# Patient Record
Sex: FEMALE | Race: BLACK OR AFRICAN AMERICAN | NOT HISPANIC OR LATINO | URBAN - METROPOLITAN AREA
[De-identification: names, ages, dates, MRNs, and addresses within clinical notes are randomized per-mention and may not be internally consistent; named-entity substitution may affect disease eponyms.]

---

## 2020-06-15 ENCOUNTER — OFFICE VISIT (OUTPATIENT)
Dept: URBAN - METROPOLITAN AREA CLINIC 94 | Facility: CLINIC | Age: 40
End: 2020-06-15

## 2020-06-16 ENCOUNTER — TELEPHONE ENCOUNTER (OUTPATIENT)
Dept: URBAN - METROPOLITAN AREA CLINIC 23 | Facility: CLINIC | Age: 40
End: 2020-06-16

## 2020-06-18 ENCOUNTER — TELEPHONE ENCOUNTER (OUTPATIENT)
Dept: URBAN - METROPOLITAN AREA CLINIC 92 | Facility: CLINIC | Age: 40
End: 2020-06-18

## 2020-07-01 ENCOUNTER — LAB OUTSIDE AN ENCOUNTER (OUTPATIENT)
Dept: URBAN - METROPOLITAN AREA CLINIC 92 | Facility: CLINIC | Age: 40
End: 2020-07-01

## 2020-07-01 ENCOUNTER — TELEPHONE ENCOUNTER (OUTPATIENT)
Dept: URBAN - METROPOLITAN AREA CLINIC 92 | Facility: CLINIC | Age: 40
End: 2020-07-01

## 2020-07-02 ENCOUNTER — TELEPHONE ENCOUNTER (OUTPATIENT)
Dept: URBAN - METROPOLITAN AREA CLINIC 23 | Facility: CLINIC | Age: 40
End: 2020-07-02

## 2020-07-15 ENCOUNTER — OFFICE VISIT (OUTPATIENT)
Dept: URBAN - METROPOLITAN AREA MEDICAL CENTER 28 | Facility: MEDICAL CENTER | Age: 40
End: 2020-07-15

## 2020-09-28 ENCOUNTER — ERX REFILL RESPONSE (OUTPATIENT)
Age: 40
End: 2020-09-28

## 2020-09-28 RX ORDER — SCOPOLAMINE 1 MG/3D
APPLY 1 PATCH BY TO THE HAIRLESS SKIN AREA BEHIND 1 EAR, REAPPLY EVERY 3 DAYS AS NEEDED PATCH TRANSDERMAL
Qty: 2 | Refills: 4

## 2020-10-13 ENCOUNTER — ERX REFILL RESPONSE (OUTPATIENT)
Age: 40
End: 2020-10-13

## 2020-10-13 RX ORDER — DEXLANSOPRAZOLE 60 MG/1
TAKE 1 CAPSULE BY MOUTH EVERY DAY CAPSULE, DELAYED RELEASE ORAL
Qty: 30 | Refills: 8

## 2020-12-28 ENCOUNTER — TELEPHONE ENCOUNTER (OUTPATIENT)
Dept: URBAN - METROPOLITAN AREA CLINIC 92 | Facility: CLINIC | Age: 40
End: 2020-12-28

## 2020-12-28 RX ORDER — SCOPOLAMINE 1 MG/3D
APPLY 1 PATCH BY TO THE HAIRLESS SKIN AREA BEHIND 1 EAR, REAPPLY EVERY 3 DAYS AS NEEDED PATCH TRANSDERMAL
Qty: 2 | Refills: 4 | Status: ACTIVE | COMMUNITY

## 2020-12-28 RX ORDER — DEXLANSOPRAZOLE 60 MG/1
TAKE 1 CAPSULE BY MOUTH EVERY DAY CAPSULE, DELAYED RELEASE ORAL
Qty: 30 | Refills: 8 | Status: ACTIVE | COMMUNITY

## 2020-12-28 RX ORDER — FAMOTIDINE 40 MG/1
1 TABLET AT BEDTIME TABLET, FILM COATED ORAL ONCE A DAY
Qty: 90 TABLET | Refills: 3 | Status: ACTIVE | COMMUNITY
Start: 2020-12-28

## 2020-12-28 RX ORDER — FAMOTIDINE 40 MG/1
1 TABLET AT BEDTIME TABLET, FILM COATED ORAL ONCE A DAY
Qty: 90 TABLET | Refills: 3 | OUTPATIENT
Start: 2020-12-28

## 2021-01-13 ENCOUNTER — OFFICE VISIT (OUTPATIENT)
Dept: URBAN - METROPOLITAN AREA CLINIC 92 | Facility: CLINIC | Age: 41
End: 2021-01-13
Payer: MEDICARE

## 2021-01-13 DIAGNOSIS — Z98.84 HISTORY OF ROUX-EN-Y GASTRIC BYPASS: ICD-10-CM

## 2021-01-13 DIAGNOSIS — R10.9 ABDOMINAL PAIN: ICD-10-CM

## 2021-01-13 DIAGNOSIS — K21.9 GERD: ICD-10-CM

## 2021-01-13 DIAGNOSIS — R11.2 NAUSEA & VOMITING: ICD-10-CM

## 2021-01-13 PROCEDURE — G8427 DOCREV CUR MEDS BY ELIG CLIN: HCPCS | Performed by: INTERNAL MEDICINE

## 2021-01-13 PROCEDURE — G8417 CALC BMI ABV UP PARAM F/U: HCPCS | Performed by: INTERNAL MEDICINE

## 2021-01-13 PROCEDURE — G8482 FLU IMMUNIZE ORDER/ADMIN: HCPCS | Performed by: INTERNAL MEDICINE

## 2021-01-13 PROCEDURE — 1036F TOBACCO NON-USER: CPT | Performed by: INTERNAL MEDICINE

## 2021-01-13 PROCEDURE — 99214 OFFICE O/P EST MOD 30 MIN: CPT | Performed by: INTERNAL MEDICINE

## 2021-01-13 RX ORDER — NORTRIPTYLINE HYDROCHLORIDE 25 MG/1
1 CAPSULE CAPSULE ORAL AT NIGHT
Qty: 30 | Refills: 11 | OUTPATIENT
Start: 2021-01-13

## 2021-01-13 RX ORDER — DEXLANSOPRAZOLE 60 MG/1
TAKE 1 CAPSULE BY MOUTH EVERY DAY CAPSULE, DELAYED RELEASE ORAL
Qty: 30 | Refills: 8 | Status: ACTIVE | COMMUNITY

## 2021-01-13 NOTE — HPI-TODAY'S VISIT:
This is a 40-year-old female who presents today for a sick visit.  She is typically cared for by Dr. Murphy.  She notes that for the last 2 to 3 months she has had increasing abdominal pain and cramping mostly lower this can be severe and last for couple of days or just last for a couple of hours.  There are no exacerbating factors.  She does have relief after a bowel movement on some occasions.  She has had some relief with dicyclomine.  She has had endoscopic evaluation in the past with EGD and colonoscopy.  She notes having had a CT scan with another provider last month which was reportedly normal.  Otherwise her appetite is normal.  She does have intermittent nausea and vomiting at baseline.

## 2021-02-02 ENCOUNTER — OFFICE VISIT (OUTPATIENT)
Dept: URBAN - METROPOLITAN AREA CLINIC 52 | Facility: CLINIC | Age: 41
End: 2021-02-02
Payer: MEDICARE

## 2021-02-02 ENCOUNTER — WEB ENCOUNTER (OUTPATIENT)
Dept: URBAN - METROPOLITAN AREA CLINIC 52 | Facility: CLINIC | Age: 41
End: 2021-02-02

## 2021-02-02 DIAGNOSIS — R10.9 ABDOMINAL PAIN: ICD-10-CM

## 2021-02-02 DIAGNOSIS — R19.7 DIARRHEA: ICD-10-CM

## 2021-02-02 DIAGNOSIS — K21.9 GERD: ICD-10-CM

## 2021-02-02 DIAGNOSIS — R11.2 NAUSEA & VOMITING: ICD-10-CM

## 2021-02-02 PROCEDURE — G8427 DOCREV CUR MEDS BY ELIG CLIN: HCPCS | Performed by: INTERNAL MEDICINE

## 2021-02-02 PROCEDURE — G8420 CALC BMI NORM PARAMETERS: HCPCS | Performed by: INTERNAL MEDICINE

## 2021-02-02 PROCEDURE — 99214 OFFICE O/P EST MOD 30 MIN: CPT | Performed by: INTERNAL MEDICINE

## 2021-02-02 PROCEDURE — 1036F TOBACCO NON-USER: CPT | Performed by: INTERNAL MEDICINE

## 2021-02-02 RX ORDER — PANTOPRAZOLE SODIUM 40 MG/1
1 TABLET TABLET, DELAYED RELEASE ORAL ONCE A DAY
Qty: 30 | Refills: 6 | OUTPATIENT
Start: 2021-02-02

## 2021-02-02 RX ORDER — FAMOTIDINE 40 MG/1
1 TABLET AT BEDTIME TABLET, FILM COATED ORAL ONCE A DAY
Status: ACTIVE | COMMUNITY

## 2021-02-02 RX ORDER — COLESEVELAM HYDROCHLORIDE 625 MG/1
1 TABLET TABLET, COATED ORAL TWICE A DAY
Qty: 60 | Refills: 6 | OUTPATIENT
Start: 2021-02-02

## 2021-02-02 RX ORDER — NORTRIPTYLINE HYDROCHLORIDE 25 MG/1
1 CAPSULE CAPSULE ORAL AT NIGHT
Qty: 30 | Refills: 11 | Status: ACTIVE | COMMUNITY
Start: 2021-01-13

## 2021-02-02 RX ORDER — DEXLANSOPRAZOLE 60 MG/1
TAKE 1 CAPSULE BY MOUTH EVERY DAY CAPSULE, DELAYED RELEASE ORAL
Qty: 30 | Refills: 8 | Status: ON HOLD | COMMUNITY

## 2021-02-02 RX ORDER — DICYCLOMINE HYDROCHLORIDE 20 MG/1
1 TABLET TABLET ORAL THREE TIMES A DAY
Status: ACTIVE | COMMUNITY

## 2021-02-02 NOTE — HPI-TODAY'S VISIT:
4 months she has had crampy diffuse abdominal pain and can be severe and last for couple of days or just last for a couple of hours.  There are no exacerbating factors.  She does have relief after a bowel movement on some occasions.  She has had some relief with dicyclomine.  CT scan with another provider 12/2020 was normal per pt.   Intermittent nausea and vomiting daily. Takes Pepcid TID.  2001-Gastric bypass. 9/20186002-Kcxwkrdiogg-Wlpzgo. 1 Tub Adenoma. 6 Hyperplastic 9/2019-EGD-HH. Mild gastritis. Food. BX of D and GEJ WNL. 5/20203501-CCF-Xrue En Y stomach. Stenosis dilated to 15 5/20204627-QMG-Qqvt En Y. Reflux. 3096-CXNW-RR

## 2021-02-05 ENCOUNTER — TELEPHONE ENCOUNTER (OUTPATIENT)
Dept: URBAN - METROPOLITAN AREA CLINIC 52 | Facility: CLINIC | Age: 41
End: 2021-02-05

## 2021-02-05 ENCOUNTER — TELEPHONE ENCOUNTER (OUTPATIENT)
Dept: URBAN - METROPOLITAN AREA CLINIC 92 | Facility: CLINIC | Age: 41
End: 2021-02-05

## 2021-02-05 ENCOUNTER — LAB OUTSIDE AN ENCOUNTER (OUTPATIENT)
Dept: URBAN - METROPOLITAN AREA CLINIC 92 | Facility: CLINIC | Age: 41
End: 2021-02-05

## 2021-02-05 LAB
C-REACTIVE PROTEIN, QUANT: 43
ENDOMYSIAL ANTIBODY IGA: NEGATIVE
F002-IGE MILK: <0.1
F004-IGE WHEAT: <0.1
F008-IGE CORN: 0.21
F013-IGE PEANUT: <0.1
F014-IGE SOYBEAN: <0.1
F026-IGE PORK: <0.1
F027-IGE BEEF: <0.1
F093-IGE CHOCOLATE/CACAO: <0.1
F245-IGE EGG, WHOLE: <0.1
FX02-IGE FOOD MIX (SEAFOODS): NEGATIVE
IMMUNOGLOBULIN A, QN, SERUM: 455
Lab: (no result)
T-TRANSGLUTAMINASE (TTG) IGA: <2

## 2021-02-12 ENCOUNTER — TELEPHONE ENCOUNTER (OUTPATIENT)
Dept: URBAN - METROPOLITAN AREA CLINIC 92 | Facility: CLINIC | Age: 41
End: 2021-02-12

## 2021-02-12 RX ORDER — DICYCLOMINE HYDROCHLORIDE 20 MG/1
1 TABLET TABLET ORAL THREE TIMES A DAY
Qty: 42 TABLET | Refills: 1

## 2021-02-18 ENCOUNTER — OFFICE VISIT (OUTPATIENT)
Dept: URBAN - METROPOLITAN AREA CLINIC 52 | Facility: CLINIC | Age: 41
End: 2021-02-18

## 2021-02-19 ENCOUNTER — TELEPHONE ENCOUNTER (OUTPATIENT)
Dept: URBAN - METROPOLITAN AREA CLINIC 92 | Facility: CLINIC | Age: 41
End: 2021-02-19

## 2021-02-24 ENCOUNTER — TELEPHONE ENCOUNTER (OUTPATIENT)
Dept: URBAN - METROPOLITAN AREA CLINIC 92 | Facility: CLINIC | Age: 41
End: 2021-02-24

## 2021-02-24 RX ORDER — CIPROFLOXACIN HYDROCHLORIDE 500 MG/1
1 TABLET TABLET, FILM COATED ORAL
Qty: 20 | Refills: 0 | OUTPATIENT
Start: 2021-02-24 | End: 2021-03-06

## 2021-02-24 RX ORDER — FAMOTIDINE 40 MG/1
1 TABLET AT BEDTIME TABLET, FILM COATED ORAL ONCE A DAY
Status: ACTIVE | COMMUNITY

## 2021-02-24 RX ORDER — PANTOPRAZOLE SODIUM 40 MG/1
1 TABLET TABLET, DELAYED RELEASE ORAL ONCE A DAY
Qty: 30 | Refills: 6 | Status: ACTIVE | COMMUNITY
Start: 2021-02-02

## 2021-02-24 RX ORDER — COLESEVELAM HYDROCHLORIDE 625 MG/1
1 TABLET TABLET, COATED ORAL TWICE A DAY
Qty: 60 | Refills: 6 | Status: ACTIVE | COMMUNITY
Start: 2021-02-02

## 2021-02-24 RX ORDER — NORTRIPTYLINE HYDROCHLORIDE 25 MG/1
1 CAPSULE CAPSULE ORAL AT NIGHT
Qty: 30 | Refills: 11 | Status: ACTIVE | COMMUNITY
Start: 2021-01-13

## 2021-02-24 RX ORDER — DICYCLOMINE HYDROCHLORIDE 20 MG/1
1 TABLET TABLET ORAL THREE TIMES A DAY
Qty: 42 TABLET | Refills: 1 | Status: ACTIVE | COMMUNITY

## 2021-02-24 RX ORDER — DEXLANSOPRAZOLE 60 MG/1
TAKE 1 CAPSULE BY MOUTH EVERY DAY CAPSULE, DELAYED RELEASE ORAL
Qty: 30 | Refills: 8 | Status: ON HOLD | COMMUNITY

## 2021-02-24 RX ORDER — METRONIDAZOLE 250 MG/1
1 TABLET TABLET ORAL
Qty: 56 TABLET | Refills: 0 | OUTPATIENT
Start: 2021-02-24 | End: 2021-03-10

## 2021-03-12 ENCOUNTER — CLAIMS CREATED FROM THE CLAIM WINDOW (OUTPATIENT)
Dept: URBAN - METROPOLITAN AREA CLINIC 94 | Facility: CLINIC | Age: 41
End: 2021-03-12
Payer: MEDICARE

## 2021-03-12 DIAGNOSIS — R93.5 ABNORMAL MRI OF ABDOMEN: ICD-10-CM

## 2021-03-12 DIAGNOSIS — R10.84 GENERALIZED ABDOMINAL PAIN: ICD-10-CM

## 2021-03-12 DIAGNOSIS — R11.2 NON-INTRACTABLE VOMITING WITH NAUSEA, UNSPECIFIED VOMITING TYPE: ICD-10-CM

## 2021-03-12 PROCEDURE — 99203 OFFICE O/P NEW LOW 30 MIN: CPT | Performed by: INTERNAL MEDICINE

## 2021-03-12 PROCEDURE — 99213 OFFICE O/P EST LOW 20 MIN: CPT | Performed by: INTERNAL MEDICINE

## 2021-03-12 RX ORDER — FAMOTIDINE 40 MG/1
1 TABLET AT BEDTIME TABLET, FILM COATED ORAL ONCE A DAY
Status: ACTIVE | COMMUNITY

## 2021-03-12 RX ORDER — PANTOPRAZOLE SODIUM 40 MG/1
1 TABLET TABLET, DELAYED RELEASE ORAL ONCE A DAY
Qty: 30 | Refills: 6 | Status: ACTIVE | COMMUNITY
Start: 2021-02-02

## 2021-03-12 RX ORDER — DICYCLOMINE HYDROCHLORIDE 20 MG/1
1 TABLET TABLET ORAL THREE TIMES A DAY
Qty: 42 TABLET | Refills: 1 | Status: ACTIVE | COMMUNITY

## 2021-03-12 RX ORDER — DEXLANSOPRAZOLE 60 MG/1
TAKE 1 CAPSULE BY MOUTH EVERY DAY CAPSULE, DELAYED RELEASE ORAL
Qty: 30 | Refills: 8 | Status: ON HOLD | COMMUNITY

## 2021-03-12 RX ORDER — NORTRIPTYLINE HYDROCHLORIDE 25 MG/1
1 CAPSULE CAPSULE ORAL AT NIGHT
Qty: 30 | Refills: 11 | Status: ACTIVE | COMMUNITY
Start: 2021-01-13

## 2021-03-12 RX ORDER — ONDANSETRON 8 MG/1
1 TABLET ON THE TONGUE AND ALLOW TO DISSOLVE  AS NEEDED TABLET, ORALLY DISINTEGRATING ORAL
Qty: 20 | Refills: 2 | OUTPATIENT
Start: 2021-03-12

## 2021-03-12 RX ORDER — COLESEVELAM HYDROCHLORIDE 625 MG/1
1 TABLET TABLET, COATED ORAL TWICE A DAY
Qty: 60 | Refills: 6 | Status: ON HOLD | COMMUNITY
Start: 2021-02-02

## 2021-03-12 NOTE — HPI-TODAY'S VISIT:
4 months crampy diffuse abdominal pain and can be severe and last for couple of days or for a couple of hours.  no exacerbating factors.  She does have relief after a bowel movement on some occasions and some relief with dicyclomine.  CT scan with another provider 12/2020 was normal per pt.   Intermittent nausea and vomiting daily. Takes Pepcid TID.  2001-Gastric bypass. 9/20183646-Rffrmzuqgoh-Xgvuvb. 1 Tub Adenoma. 6 Hyperplastic 9/2019-EGD-HH. Mild gastritis. Food. BX of D and GEJ WNL. 5/20208270-IKY-Pvdb En Y stomach. Stenosis dilated to 15 5/20203441-OGD-Ntpz En Y. Reflux. 4382-USUJ-MR 2/2021-MRE-mild thickening in proximal small intestine She was RXed Cipro and Flagyl for possible infectious issue. Took for about 7-10 days. Developed bad NV and abd pain. DCed

## 2021-03-12 NOTE — PHYSICAL EXAM GASTROINTESTINAL
Abdomen , soft, tender on Left side, nondistended , no guarding or rigidity , no masses palpable , normal bowel sounds , Liver and Spleen , no hepatomegaly present , no hepatosplenomegaly , liver nontender , spleen not palpable

## 2021-03-16 LAB
A/G RATIO: 1.4
ALBUMIN: 4.2
ALKALINE PHOSPHATASE: 74
ALT (SGPT): 14
AST (SGOT): 29
BASO (ABSOLUTE): 0
BASOS: 0
BILIRUBIN, TOTAL: 0.5
BUN/CREATININE RATIO: 9
BUN: 9
C-REACTIVE PROTEIN, QUANT: <1
CALCIUM: 9.8
CARBON DIOXIDE, TOTAL: 28
CHLORIDE: 105
CREATININE: 1
EGFR IF AFRICN AM: 80
EGFR IF NONAFRICN AM: 70
EOS (ABSOLUTE): 0
EOS: 1
GLOBULIN, TOTAL: 2.9
GLUCOSE: 107
HEMATOCRIT: 37.9
HEMATOLOGY COMMENTS:: (no result)
HEMOGLOBIN: 12.8
IMMATURE CELLS: (no result)
IMMATURE GRANS (ABS): 0
IMMATURE GRANULOCYTES: 0
LYMPHS (ABSOLUTE): 1.9
LYMPHS: 43
MCH: 33.5
MCHC: 33.8
MCV: 99
MONOCYTES(ABSOLUTE): 0.4
MONOCYTES: 9
NEUTROPHILS (ABSOLUTE): 2.1
NEUTROPHILS: 47
NRBC: (no result)
PLATELETS: 197
POTASSIUM: 3.7
PROTEIN, TOTAL: 7.1
RBC: 3.82
RDW: 13
SACCHAROMYCES CEREVISIAE, IGA: 20.4
SACCHAROMYCES CEREVISIAE, IGG: 22.8
SODIUM: 147
WBC: 4.4

## 2021-04-15 ENCOUNTER — CLAIMS CREATED FROM THE CLAIM WINDOW (OUTPATIENT)
Dept: URBAN - METROPOLITAN AREA CLINIC 52 | Facility: CLINIC | Age: 41
End: 2021-04-15
Payer: MEDICARE

## 2021-04-15 ENCOUNTER — LAB OUTSIDE AN ENCOUNTER (OUTPATIENT)
Dept: URBAN - METROPOLITAN AREA CLINIC 52 | Facility: CLINIC | Age: 41
End: 2021-04-15

## 2021-04-15 DIAGNOSIS — K64.1 GRADE II HEMORRHOIDS: ICD-10-CM

## 2021-04-15 DIAGNOSIS — K50.00 CROHN'S DISEASE OF SMALL INTESTINE WITHOUT COMPLICATION: ICD-10-CM

## 2021-04-15 DIAGNOSIS — Z86.010 HISTORY OF COLON POLYPS: ICD-10-CM

## 2021-04-15 PROCEDURE — 99204 OFFICE O/P NEW MOD 45 MIN: CPT | Performed by: INTERNAL MEDICINE

## 2021-04-15 PROCEDURE — 99214 OFFICE O/P EST MOD 30 MIN: CPT | Performed by: INTERNAL MEDICINE

## 2021-04-15 RX ORDER — COLESEVELAM HYDROCHLORIDE 625 MG/1
1 TABLET TABLET, COATED ORAL TWICE A DAY
Qty: 60 | Refills: 6 | Status: ON HOLD | COMMUNITY
Start: 2021-02-02

## 2021-04-15 RX ORDER — DICYCLOMINE HYDROCHLORIDE 20 MG/1
1 TABLET TABLET ORAL THREE TIMES A DAY
Qty: 42 TABLET | Refills: 1 | Status: ACTIVE | COMMUNITY

## 2021-04-15 RX ORDER — DEXLANSOPRAZOLE 60 MG/1
TAKE 1 CAPSULE BY MOUTH EVERY DAY CAPSULE, DELAYED RELEASE ORAL
Qty: 30 | Refills: 8 | Status: ON HOLD | COMMUNITY

## 2021-04-15 RX ORDER — ONDANSETRON 8 MG/1
1 TABLET ON THE TONGUE AND ALLOW TO DISSOLVE  AS NEEDED TABLET, ORALLY DISINTEGRATING ORAL
Qty: 20 | Refills: 2 | Status: ACTIVE | COMMUNITY
Start: 2021-03-12

## 2021-04-15 RX ORDER — NORTRIPTYLINE HYDROCHLORIDE 25 MG/1
1 CAPSULE CAPSULE ORAL AT NIGHT
Qty: 30 | Refills: 11 | Status: ACTIVE | COMMUNITY
Start: 2021-01-13

## 2021-04-15 RX ORDER — PANTOPRAZOLE SODIUM 40 MG/1
1 TABLET TABLET, DELAYED RELEASE ORAL ONCE A DAY
Qty: 30 | Refills: 6 | Status: DISCONTINUED | COMMUNITY
Start: 2021-02-02

## 2021-04-15 RX ORDER — POLYETHYLENE GLYCOL 3350 17 G/17G
AS DIRECTED POWDER, FOR SOLUTION ORAL
Qty: 238 GRAM | Refills: 0 | OUTPATIENT
Start: 2021-04-15 | End: 2021-04-16

## 2021-04-15 RX ORDER — FAMOTIDINE 40 MG/1
1 TABLET AT BEDTIME TABLET, FILM COATED ORAL ONCE A DAY
Status: DISCONTINUED | COMMUNITY

## 2021-04-15 RX ORDER — MESALAMINE 500 MG/1
2 CAPSULES CAPSULE ORAL THREE TIMES A DAY
Qty: 180 CAPSULE | Refills: 4 | OUTPATIENT
Start: 2021-04-15 | End: 2021-09-12

## 2021-04-15 NOTE — HPI-TODAY'S VISIT:
crampy diffuse abdominal pain and can be severe and last for couple of days or for a couple of hours.  no exacerbating factors.  She does have relief after a bowel movement on some occasions and some relief with dicyclomine.  CT scan with another provider 12/2020 was normal per pt.   Intermittent nausea and vomiting daily. Takes Pepcid TID.  2001-Gastric bypass. 2/20198597-Cplzlhrvbbk-Hvoceo.1 Tub Adenoma.6 Hyperplastic IH 9/2019-EGD-HH. Mild gastritis. Food. BX of D and GEJ WNL. 5/20206050-JIJ-Yemj En Y stomach. Stenosis dilated to 15 5/20208318-TZX-Ormm En Y. Reflux. 6752-IADC-CN 2/2021-MRE-mild thickening in proximal small intestine She was RXed Cipro and Flagyl for possible infectious issue. Took for about 7-10 days. Developed bad NV and abd pain. DCed

## 2021-04-22 LAB
BASO (ABSOLUTE): 0
BASOS: 0
C-REACTIVE PROTEIN, QUANT: <1
EOS (ABSOLUTE): 0
EOS: 1
HBSAG SCREEN: NEGATIVE
HEMATOCRIT: 39.4
HEMATOLOGY COMMENTS:: (no result)
HEMOGLOBIN: 13.4
HEP B CORE AB, TOT: NEGATIVE
HEP B SURFACE AB, QUAL: NON REACTIVE
IMMATURE CELLS: (no result)
IMMATURE GRANS (ABS): 0
IMMATURE GRANULOCYTES: 0
INTERPRETATION:: (no result)
LYMPHS (ABSOLUTE): 1.1
LYMPHS: 29
Lab: (no result)
MCH: 35.6
MCHC: 34
MCV: 105
MONOCYTES(ABSOLUTE): 0.4
MONOCYTES: 11
NEUTROPHILS (ABSOLUTE): 2.1
NEUTROPHILS: 59
NRBC: (no result)
PLATELETS: 211
QUANTIFERON CRITERIA: (no result)
QUANTIFERON INCUBATION: (no result)
QUANTIFERON MITOGEN VALUE: >10
QUANTIFERON NIL VALUE: 0.04
QUANTIFERON TB1 AG VALUE: 0.02
QUANTIFERON TB2 AG VALUE: 0.03
QUANTIFERON-TB GOLD PLUS: NEGATIVE
RBC: 3.76
RDW: 13.4
TPMT ACTIVITY: 26.3
TSH: 1.01
WBC: 3.7

## 2021-04-26 PROBLEM — 56689002: Status: ACTIVE | Noted: 2021-04-15

## 2021-05-12 ENCOUNTER — OFFICE VISIT (OUTPATIENT)
Dept: URBAN - METROPOLITAN AREA SURGERY CENTER 17 | Facility: SURGERY CENTER | Age: 41
End: 2021-05-12

## 2021-05-21 ENCOUNTER — OFFICE VISIT (OUTPATIENT)
Dept: URBAN - METROPOLITAN AREA SURGERY CENTER 17 | Facility: SURGERY CENTER | Age: 41
End: 2021-05-21
Payer: MEDICARE

## 2021-05-21 DIAGNOSIS — D12.2 ADENOMA OF ASCENDING COLON: ICD-10-CM

## 2021-05-21 DIAGNOSIS — R93.3 ABN FINDINGS-GI TRACT: ICD-10-CM

## 2021-05-21 DIAGNOSIS — R10.84 ABDOMINAL CRAMPING, GENERALIZED: ICD-10-CM

## 2021-05-21 DIAGNOSIS — K64.1 GRADE II HEMORRHOIDS: ICD-10-CM

## 2021-05-21 DIAGNOSIS — K63.89 BACTERIAL OVERGROWTH SYNDROME: ICD-10-CM

## 2021-05-21 PROCEDURE — 45385 COLONOSCOPY W/LESION REMOVAL: CPT | Performed by: INTERNAL MEDICINE

## 2021-05-21 PROCEDURE — 45380 COLONOSCOPY AND BIOPSY: CPT | Performed by: INTERNAL MEDICINE

## 2021-05-21 PROCEDURE — G8907 PT DOC NO EVENTS ON DISCHARG: HCPCS | Performed by: INTERNAL MEDICINE

## 2021-05-21 PROCEDURE — 46221 LIGATION OF HEMORRHOID(S): CPT | Performed by: INTERNAL MEDICINE

## 2021-05-21 RX ORDER — MESALAMINE 500 MG/1
2 CAPSULES CAPSULE ORAL THREE TIMES A DAY
Qty: 180 CAPSULE | Refills: 4 | Status: ACTIVE | COMMUNITY
Start: 2021-04-15 | End: 2021-09-12

## 2021-05-21 RX ORDER — DICYCLOMINE HYDROCHLORIDE 20 MG/1
1 TABLET TABLET ORAL THREE TIMES A DAY
Qty: 42 TABLET | Refills: 1 | Status: ACTIVE | COMMUNITY

## 2021-05-21 RX ORDER — NORTRIPTYLINE HYDROCHLORIDE 25 MG/1
1 CAPSULE CAPSULE ORAL AT NIGHT
Qty: 30 | Refills: 11 | Status: ACTIVE | COMMUNITY
Start: 2021-01-13

## 2021-05-21 RX ORDER — DEXLANSOPRAZOLE 60 MG/1
TAKE 1 CAPSULE BY MOUTH EVERY DAY CAPSULE, DELAYED RELEASE ORAL
Qty: 30 | Refills: 8 | Status: ON HOLD | COMMUNITY

## 2021-05-21 RX ORDER — COLESEVELAM HYDROCHLORIDE 625 MG/1
1 TABLET TABLET, COATED ORAL TWICE A DAY
Qty: 60 | Refills: 6 | Status: ON HOLD | COMMUNITY
Start: 2021-02-02

## 2021-05-21 RX ORDER — ONDANSETRON 8 MG/1
1 TABLET ON THE TONGUE AND ALLOW TO DISSOLVE  AS NEEDED TABLET, ORALLY DISINTEGRATING ORAL
Qty: 20 | Refills: 2 | Status: ACTIVE | COMMUNITY
Start: 2021-03-12

## 2021-05-22 ENCOUNTER — TELEPHONE ENCOUNTER (OUTPATIENT)
Dept: URBAN - METROPOLITAN AREA CLINIC 94 | Facility: CLINIC | Age: 41
End: 2021-05-22

## 2021-05-25 ENCOUNTER — LAB OUTSIDE AN ENCOUNTER (OUTPATIENT)
Dept: URBAN - METROPOLITAN AREA CLINIC 52 | Facility: CLINIC | Age: 41
End: 2021-05-25

## 2021-05-25 ENCOUNTER — TELEPHONE ENCOUNTER (OUTPATIENT)
Dept: URBAN - METROPOLITAN AREA CLINIC 92 | Facility: CLINIC | Age: 41
End: 2021-05-25

## 2021-05-25 ENCOUNTER — CLAIMS CREATED FROM THE CLAIM WINDOW (OUTPATIENT)
Dept: URBAN - METROPOLITAN AREA CLINIC 52 | Facility: CLINIC | Age: 41
End: 2021-05-25
Payer: MEDICARE

## 2021-05-25 DIAGNOSIS — K92.0 HEMATEMESIS WITH NAUSEA: ICD-10-CM

## 2021-05-25 DIAGNOSIS — R93.5 ABNORMAL MRI OF ABDOMEN: ICD-10-CM

## 2021-05-25 DIAGNOSIS — R10.84 GENERALIZED ABDOMINAL PAIN: ICD-10-CM

## 2021-05-25 DIAGNOSIS — R11.2 NAUSEA & VOMITING: ICD-10-CM

## 2021-05-25 DIAGNOSIS — K63.5 POLYP OF COLON, UNSPECIFIED PART OF COLON, UNSPECIFIED TYPE: ICD-10-CM

## 2021-05-25 PROCEDURE — 99213 OFFICE O/P EST LOW 20 MIN: CPT | Performed by: INTERNAL MEDICINE

## 2021-05-25 PROCEDURE — 99203 OFFICE O/P NEW LOW 30 MIN: CPT | Performed by: INTERNAL MEDICINE

## 2021-05-25 RX ORDER — ONDANSETRON HYDROCHLORIDE 4 MG/1
1 TABLET TABLET, FILM COATED ORAL ONCE A DAY
Qty: 30 | Refills: 6 | OUTPATIENT
Start: 2021-05-25

## 2021-05-25 RX ORDER — MESALAMINE 500 MG/1
2 CAPSULES CAPSULE ORAL
Qty: 240 | Refills: 11 | OUTPATIENT
Start: 2021-05-25 | End: 2022-05-20

## 2021-05-25 RX ORDER — ONDANSETRON 8 MG/1
1 TABLET ON THE TONGUE AND ALLOW TO DISSOLVE  AS NEEDED TABLET, ORALLY DISINTEGRATING ORAL
Qty: 20 | Refills: 2 | Status: ACTIVE | COMMUNITY
Start: 2021-03-12

## 2021-05-25 RX ORDER — PANTOPRAZOLE SODIUM 40 MG/1
1 TABLET TABLET, DELAYED RELEASE ORAL ONCE A DAY
Qty: 30 | Refills: 11 | OUTPATIENT
Start: 2021-05-25

## 2021-05-25 RX ORDER — COLESEVELAM HYDROCHLORIDE 625 MG/1
1 TABLET TABLET, COATED ORAL TWICE A DAY
Qty: 60 | Refills: 6 | Status: ON HOLD | COMMUNITY
Start: 2021-02-02

## 2021-05-25 RX ORDER — NORTRIPTYLINE HYDROCHLORIDE 25 MG/1
1 CAPSULE CAPSULE ORAL AT NIGHT
Qty: 30 | Refills: 11 | Status: ACTIVE | COMMUNITY
Start: 2021-01-13

## 2021-05-25 RX ORDER — DICYCLOMINE HYDROCHLORIDE 20 MG/1
1 TABLET TABLET ORAL THREE TIMES A DAY
Qty: 42 TABLET | Refills: 1 | Status: DISCONTINUED | COMMUNITY

## 2021-05-25 RX ORDER — DEXLANSOPRAZOLE 60 MG/1
TAKE 1 CAPSULE BY MOUTH EVERY DAY CAPSULE, DELAYED RELEASE ORAL
Qty: 30 | Refills: 8 | Status: ON HOLD | COMMUNITY

## 2021-05-25 RX ORDER — MESALAMINE 500 MG/1
2 CAPSULES CAPSULE ORAL THREE TIMES A DAY
Qty: 180 CAPSULE | Refills: 4 | Status: ON HOLD | COMMUNITY
Start: 2021-04-15 | End: 2021-09-12

## 2021-05-25 NOTE — HPI-TODAY'S VISIT:
S-Colonoscopy 5/21. No colitis. 3 polyps removed Had pain and retching over weekend. Better today. Not started Pentasa. Possible computer failure to send. PRIOR:  crampy diffuse abdominal pain and can be severe and last for couple of days or for a couple of hours.  no exacerbating factors.  She does have relief after a bowel movement on some occasions and some relief with dicyclomine.  CT scan with another provider 12/2020 was normal per pt.   Intermittent nausea and vomiting daily. Takes Pepcid TID.  2001-Gastric bypass. 2/20198638-Imduhgoydql-Cehjcb.1 Tub Adenoma.6 Hyperplastic IH 9/2019-EGD-HH. Mild gastritis. Food. BX of D and GEJ WNL. 5/20208105-SSF-Zirw En Y stomach. Stenosis dilated to 15 5/20209579-RCE-Ijkl En Y. Reflux. 1331-UHGQ-RX 2/2021-MRE-mild thickening in proximal small intestine She was RXed Cipro and Flagyl for possible infectious issue. Took for about 7-10 days. Developed bad NV and abd pain. DCed crampy diffuse abdominal pain and can be severe and last for couple of days or for a couple of hours.  no exacerbating factors.  She does have relief after a bowel movement on some occasions and some relief with dicyclomine.  CT scan with another provider 12/2020 was normal per pt.   Intermittent nausea and vomiting daily. Takes Pepcid TID.  2001-Gastric bypass. 2/20194856-Lwnwylqqovw-Hmdvfm.1 Tub Adenoma.6 Hyperplastic IH 9/2019-EGD-HH. Mild gastritis. Food. BX of D and GEJ WNL. 5/20201281-ZUN-Guid En Y stomach. Stenosis dilated to 15 5/20204915-QEF-Yxxv En Y. Reflux. 1929-LKXW-GB 2/2021-MRE-mild thickening in proximal small intestine She was RXed Cipro and Flagyl for possible infectious issue. Took for about 7-10 days. Developed bad NV and abd pain. DCed

## 2021-05-27 ENCOUNTER — ERX REFILL RESPONSE (OUTPATIENT)
Dept: URBAN - METROPOLITAN AREA CLINIC 52 | Facility: CLINIC | Age: 41
End: 2021-05-27

## 2021-05-27 RX ORDER — MESALAMINE 500 MG/1
2 CAPSULES CAPSULE ORAL
Qty: 240 | Refills: 11 | OUTPATIENT

## 2021-05-27 RX ORDER — MESALAMINE 500 MG/1
TAKE 2 CAPSULES BY MOUTH 4 TIMES A DAY CAPSULE ORAL
Qty: 240 CAPSULE | Refills: 11 | OUTPATIENT

## 2021-06-09 ENCOUNTER — OFFICE VISIT (OUTPATIENT)
Dept: URBAN - METROPOLITAN AREA SURGERY CENTER 17 | Facility: SURGERY CENTER | Age: 41
End: 2021-06-09
Payer: MEDICARE

## 2021-06-09 DIAGNOSIS — R10.13 ABDOMINAL DISCOMFORT, EPIGASTRIC: ICD-10-CM

## 2021-06-09 DIAGNOSIS — K92.0 BLOODY EMESIS: ICD-10-CM

## 2021-06-09 PROCEDURE — G8907 PT DOC NO EVENTS ON DISCHARG: HCPCS | Performed by: INTERNAL MEDICINE

## 2021-06-09 PROCEDURE — 43235 EGD DIAGNOSTIC BRUSH WASH: CPT | Performed by: INTERNAL MEDICINE

## 2021-06-09 RX ORDER — COLESEVELAM HYDROCHLORIDE 625 MG/1
1 TABLET TABLET, COATED ORAL TWICE A DAY
Qty: 60 | Refills: 6 | Status: ON HOLD | COMMUNITY
Start: 2021-02-02

## 2021-06-09 RX ORDER — MESALAMINE 500 MG/1
TAKE 2 CAPSULES BY MOUTH 4 TIMES A DAY CAPSULE ORAL
Qty: 240 CAPSULE | Refills: 11 | Status: ACTIVE | COMMUNITY

## 2021-06-09 RX ORDER — PANTOPRAZOLE SODIUM 40 MG/1
1 TABLET TABLET, DELAYED RELEASE ORAL ONCE A DAY
Qty: 30 | Refills: 11 | Status: ACTIVE | COMMUNITY
Start: 2021-05-25

## 2021-06-09 RX ORDER — NORTRIPTYLINE HYDROCHLORIDE 25 MG/1
1 CAPSULE CAPSULE ORAL AT NIGHT
Qty: 30 | Refills: 11 | Status: ACTIVE | COMMUNITY
Start: 2021-01-13

## 2021-06-09 RX ORDER — ONDANSETRON 8 MG/1
1 TABLET ON THE TONGUE AND ALLOW TO DISSOLVE  AS NEEDED TABLET, ORALLY DISINTEGRATING ORAL
Qty: 20 | Refills: 2 | Status: ACTIVE | COMMUNITY
Start: 2021-03-12

## 2021-06-09 RX ORDER — DEXLANSOPRAZOLE 60 MG/1
TAKE 1 CAPSULE BY MOUTH EVERY DAY CAPSULE, DELAYED RELEASE ORAL
Qty: 30 | Refills: 8 | Status: ON HOLD | COMMUNITY

## 2021-06-09 RX ORDER — ONDANSETRON HYDROCHLORIDE 4 MG/1
1 TABLET TABLET, FILM COATED ORAL ONCE A DAY
Qty: 30 | Refills: 6 | Status: ACTIVE | COMMUNITY
Start: 2021-05-25

## 2021-10-19 ENCOUNTER — TELEPHONE ENCOUNTER (OUTPATIENT)
Dept: URBAN - METROPOLITAN AREA CLINIC 92 | Facility: CLINIC | Age: 41
End: 2021-10-19

## 2021-10-19 RX ORDER — DEXLANSOPRAZOLE 60 MG/1
TAKE 1 CAPSULE BY MOUTH EVERY DAY CAPSULE, DELAYED RELEASE ORAL
Qty: 30 | Refills: 8 | Status: ON HOLD | COMMUNITY

## 2021-10-19 RX ORDER — SCOPALAMINE 1 MG/3D
1 PATCH TO SKIN BEHIND THE EAR AS NEEDED PATCH, EXTENDED RELEASE TRANSDERMAL
Qty: 7 | Refills: 6 | OUTPATIENT
Start: 2021-10-19 | End: 2022-03-15

## 2021-10-19 RX ORDER — COLESEVELAM HYDROCHLORIDE 625 MG/1
1 TABLET TABLET, COATED ORAL TWICE A DAY
Qty: 60 | Refills: 6 | Status: ON HOLD | COMMUNITY
Start: 2021-02-02

## 2021-10-19 RX ORDER — NORTRIPTYLINE HYDROCHLORIDE 25 MG/1
1 CAPSULE CAPSULE ORAL AT NIGHT
Qty: 30 | Refills: 11 | Status: ACTIVE | COMMUNITY
Start: 2021-01-13

## 2021-10-19 RX ORDER — MESALAMINE 500 MG/1
TAKE 2 CAPSULES BY MOUTH 4 TIMES A DAY CAPSULE ORAL
Qty: 240 CAPSULE | Refills: 11 | Status: ACTIVE | COMMUNITY

## 2021-10-19 RX ORDER — ONDANSETRON 8 MG/1
1 TABLET ON THE TONGUE AND ALLOW TO DISSOLVE  AS NEEDED TABLET, ORALLY DISINTEGRATING ORAL
Qty: 20 | Refills: 2 | Status: ACTIVE | COMMUNITY
Start: 2021-03-12

## 2021-10-19 RX ORDER — ONDANSETRON HYDROCHLORIDE 4 MG/1
1 TABLET TABLET, FILM COATED ORAL ONCE A DAY
Qty: 30 | Refills: 6 | Status: ACTIVE | COMMUNITY
Start: 2021-05-25

## 2021-10-19 RX ORDER — PANTOPRAZOLE SODIUM 40 MG/1
1 TABLET TABLET, DELAYED RELEASE ORAL ONCE A DAY
Qty: 30 | Refills: 11 | Status: ACTIVE | COMMUNITY
Start: 2021-05-25

## 2021-10-26 ENCOUNTER — WEB ENCOUNTER (OUTPATIENT)
Dept: URBAN - METROPOLITAN AREA CLINIC 52 | Facility: CLINIC | Age: 41
End: 2021-10-26

## 2021-10-26 ENCOUNTER — OFFICE VISIT (OUTPATIENT)
Dept: URBAN - METROPOLITAN AREA CLINIC 52 | Facility: CLINIC | Age: 41
End: 2021-10-26
Payer: MEDICARE

## 2021-10-26 VITALS
WEIGHT: 139.2 LBS | TEMPERATURE: 97.9 F | SYSTOLIC BLOOD PRESSURE: 152 MMHG | HEIGHT: 67 IN | DIASTOLIC BLOOD PRESSURE: 104 MMHG | HEART RATE: 91 BPM | BODY MASS INDEX: 21.85 KG/M2

## 2021-10-26 DIAGNOSIS — R11.2 INTRACTABLE VOMITING WITH NAUSEA, UNSPECIFIED VOMITING TYPE: ICD-10-CM

## 2021-10-26 DIAGNOSIS — K21.9 GASTROESOPHAGEAL REFLUX DISEASE, UNSPECIFIED WHETHER ESOPHAGITIS PRESENT: ICD-10-CM

## 2021-10-26 DIAGNOSIS — K50.90 CROHN'S DISEASE WITHOUT COMPLICATION, UNSPECIFIED GASTROINTESTINAL TRACT LOCATION: ICD-10-CM

## 2021-10-26 PROBLEM — 34000006: Status: ACTIVE | Noted: 2021-10-26

## 2021-10-26 PROCEDURE — 99213 OFFICE O/P EST LOW 20 MIN: CPT | Performed by: INTERNAL MEDICINE

## 2021-10-26 RX ORDER — FAMOTIDINE 40 MG/1
1 TABLET AT BEDTIME TABLET, FILM COATED ORAL ONCE A DAY
Qty: 30 | Refills: 11 | OUTPATIENT
Start: 2021-10-26

## 2021-10-26 RX ORDER — SCOPALAMINE 1 MG/3D
1 PATCH TO SKIN BEHIND THE EAR AS NEEDED PATCH, EXTENDED RELEASE TRANSDERMAL
Qty: 7 | Refills: 6 | Status: ON HOLD | COMMUNITY
Start: 2021-10-19 | End: 2022-03-15

## 2021-10-26 RX ORDER — NORTRIPTYLINE HYDROCHLORIDE 25 MG/1
1 CAPSULE CAPSULE ORAL AT NIGHT
Qty: 30 | Refills: 11 | Status: ON HOLD | COMMUNITY
Start: 2021-01-13

## 2021-10-26 RX ORDER — PANTOPRAZOLE SODIUM 40 MG/1
1 TABLET TABLET, DELAYED RELEASE ORAL ONCE A DAY
Qty: 30 | Refills: 11 | Status: ACTIVE | COMMUNITY
Start: 2021-05-25

## 2021-10-26 RX ORDER — MESALAMINE 500 MG/1
TAKE 2 CAPSULES BY MOUTH 4 TIMES A DAY CAPSULE ORAL
Qty: 240 CAPSULE | Refills: 11 | Status: ON HOLD | COMMUNITY

## 2021-10-26 RX ORDER — SCOPALAMINE 1 MG/3D
1 PATCH TO SKIN BEHIND THE EAR AS NEEDED PATCH, EXTENDED RELEASE TRANSDERMAL
Qty: 5 | Refills: 6 | OUTPATIENT

## 2021-10-26 RX ORDER — DEXLANSOPRAZOLE 60 MG/1
TAKE 1 CAPSULE BY MOUTH EVERY DAY CAPSULE, DELAYED RELEASE ORAL
Qty: 30 | Refills: 8 | Status: ON HOLD | COMMUNITY

## 2021-10-26 RX ORDER — ONDANSETRON 8 MG/1
1 TABLET ON THE TONGUE AND ALLOW TO DISSOLVE  AS NEEDED TABLET, ORALLY DISINTEGRATING ORAL
Qty: 20 | Refills: 2 | Status: ON HOLD | COMMUNITY
Start: 2021-03-12

## 2021-10-26 RX ORDER — ONDANSETRON HYDROCHLORIDE 4 MG/1
1 TABLET TABLET, FILM COATED ORAL ONCE A DAY
Qty: 30 | Refills: 6 | Status: ON HOLD | COMMUNITY
Start: 2021-05-25

## 2021-10-26 RX ORDER — PROMETHAZINE HYDROCHLORIDE 25 MG/1
1 TABLET AS NEEDED TABLET ORAL
Qty: 50 TABLET | Refills: 6 | OUTPATIENT
Start: 2021-10-26 | End: 2022-05-24

## 2021-10-26 RX ORDER — COLESEVELAM HYDROCHLORIDE 625 MG/1
1 TABLET TABLET, COATED ORAL TWICE A DAY
Qty: 60 | Refills: 6 | Status: ON HOLD | COMMUNITY
Start: 2021-02-02

## 2021-10-26 NOTE — HPI-TODAY'S VISIT:
S-Had bad LLQ pain and retching over weekend.Lasted 2.5 hours Was constipated. Better today. Not started Pentasa. Insurance problem. Has vomited "daily" since Gastric bypass 2001. Takes 3/day protonix.Transderm helps. PRIOR:  crampy diffuse abdominal pain and can be severe and last for couple of days or for a couple of hours.  no exacerbating factors.  She does have relief after a bowel movement on some occasions and some relief with dicyclomine.  CT scan with another provider 12/2020 was normal per pt.   Intermittent nausea and vomiting daily. 2001-Gastric bypass. 2/20192721-Fikxjzsgtmm-Brypxa.1 Tub Adenoma.6 Hyperplastic IH 9/2019-EGD-HH. Mild gastritis. Food. BX of D and GEJ WNL. 5/20200033-NMS-Byqp En Y stomach. Stenosis dilated to 15 5/20201298-IJB-Rvcp En Y. Reflux. 3495-ASKL-MH 2/2021-MRE-mild thickening in proximal small intestine 5/20210915-Ometsjjtkyl-Vo colitis. 3 polyps removed.TA + SSA 6/20218280-GSD-VDLC.Food present.No apparent stricture or lesion She was RXed Cipro and Flagyl for possible infectious issue. Took for about 7-10 days. Developed bad NV and abd pain. DCed crampy diffuse abdominal pain and can be severe and last for couple of days or for a couple of hours.  no exacerbating factors.  She does have relief after a bowel movement on some occasions and some relief with dicyclomine.  CT scan with another provider 12/2020 was normal per pt.   Intermittent nausea and vomiting daily. Takes Pepcid TID.  2001-Gastric bypass. 2/20199678-Ghixjlvvstm-Yvjuoz.1 Tub Adenoma.6 Hyperplastic IH 9/2019-EGD-HH. Mild gastritis. Food. BX of D and GEJ WNL. 5/20200233-MYS-Mssi En Y stomach. Stenosis dilated to 15 5/20200563-SPL-Wbgk En Y. Reflux. 3970-DXKJ-VB 2/2021-MRE-mild thickening in proximal small intestine She was RXed Cipro and Flagyl for possible infectious issue. Took for about 7-10 days. Developed bad NV and abd pain. DCed

## 2021-11-02 LAB
A/G RATIO: 1.7
ALBUMIN: 4.2
ALKALINE PHOSPHATASE: 89
ALT (SGPT): 10
AST (SGOT): 27
BASO (ABSOLUTE): 0
BASOS: 0
BILIRUBIN, TOTAL: 0.7
BUN/CREATININE RATIO: 10
BUN: 9
C-REACTIVE PROTEIN, QUANT: <1
CALCIUM: 9.5
CARBON DIOXIDE, TOTAL: 21
CHLORIDE: 100
CREATININE: 0.88
EGFR IF AFRICN AM: 94
EGFR IF NONAFRICN AM: 81
EOS (ABSOLUTE): 0
EOS: 1
GLOBULIN, TOTAL: 2.5
GLUCOSE: 108
HBSAG SCREEN: NEGATIVE
HEMATOCRIT: 36.4
HEMATOLOGY COMMENTS:: (no result)
HEMOGLOBIN: 12.3
HEP B CORE AB, TOT: NEGATIVE
HEP B SURFACE AB, QUAL: NON REACTIVE
IMMATURE CELLS: (no result)
IMMATURE GRANS (ABS): 0
IMMATURE GRANULOCYTES: 0
INTERPRETATION:: (no result)
LYMPHS (ABSOLUTE): 1.9
LYMPHS: 37
Lab: (no result)
MCH: 33.4
MCHC: 33.8
MCV: 99
MONOCYTES(ABSOLUTE): 0.5
MONOCYTES: 10
NEUTROPHILS (ABSOLUTE): 2.7
NEUTROPHILS: 52
NRBC: (no result)
PLATELETS: 173
POTASSIUM: 3.1
PROTEIN, TOTAL: 6.7
QUANTIFERON CRITERIA: (no result)
QUANTIFERON INCUBATION: (no result)
QUANTIFERON MITOGEN VALUE: >10
QUANTIFERON NIL VALUE: 0.06
QUANTIFERON TB1 AG VALUE: 0.06
QUANTIFERON TB2 AG VALUE: 0.07
QUANTIFERON-TB GOLD PLUS: NEGATIVE
RBC: 3.68
RDW: 13.3
SODIUM: 139
TPMT ACTIVITY: 20.2
WBC: 5.2

## 2021-12-16 ENCOUNTER — TELEPHONE ENCOUNTER (OUTPATIENT)
Dept: URBAN - METROPOLITAN AREA CLINIC 52 | Facility: CLINIC | Age: 41
End: 2021-12-16

## 2021-12-16 ENCOUNTER — TELEPHONE ENCOUNTER (OUTPATIENT)
Dept: URBAN - METROPOLITAN AREA CLINIC 92 | Facility: CLINIC | Age: 41
End: 2021-12-16

## 2021-12-16 RX ORDER — DEXLANSOPRAZOLE 60 MG/1
TAKE 1 CAPSULE BY MOUTH EVERY DAY CAPSULE, DELAYED RELEASE ORAL
Qty: 30 | Refills: 8 | Status: ON HOLD | COMMUNITY

## 2021-12-16 RX ORDER — MESALAMINE 500 MG/1
TAKE 2 CAPSULES BY MOUTH 4 TIMES A DAY CAPSULE ORAL
Qty: 240 CAPSULE | Refills: 11 | Status: ON HOLD | COMMUNITY

## 2021-12-16 RX ORDER — ONDANSETRON HYDROCHLORIDE 4 MG/1
1 TABLET TABLET, FILM COATED ORAL ONCE A DAY
Qty: 30 | Refills: 6 | Status: ON HOLD | COMMUNITY
Start: 2021-05-25

## 2021-12-16 RX ORDER — FAMOTIDINE 40 MG/1
1 TABLET AT BEDTIME TABLET, FILM COATED ORAL ONCE A DAY
Qty: 30 | Refills: 11 | Status: ACTIVE | COMMUNITY
Start: 2021-10-26

## 2021-12-16 RX ORDER — PANTOPRAZOLE SODIUM 40 MG/1
1 TABLET TABLET, DELAYED RELEASE ORAL ONCE A DAY
Qty: 30 | Refills: 11 | Status: ACTIVE | COMMUNITY
Start: 2021-05-25

## 2021-12-16 RX ORDER — PROMETHAZINE HYDROCHLORIDE 25 MG/1
1 TABLET AS NEEDED TABLET ORAL
Qty: 50 TABLET | Refills: 6 | Status: ACTIVE | COMMUNITY
Start: 2021-10-26 | End: 2022-05-24

## 2021-12-16 RX ORDER — NORTRIPTYLINE HYDROCHLORIDE 25 MG/1
1 CAPSULE CAPSULE ORAL AT NIGHT
Qty: 30 | Refills: 11 | Status: ON HOLD | COMMUNITY
Start: 2021-01-13

## 2021-12-16 RX ORDER — SCOPALAMINE 1 MG/3D
1 PATCH TO SKIN BEHIND THE EAR AS NEEDED PATCH, EXTENDED RELEASE TRANSDERMAL
Qty: 5 | Refills: 6 | Status: ACTIVE | COMMUNITY

## 2021-12-16 RX ORDER — COLESEVELAM HYDROCHLORIDE 625 MG/1
1 TABLET TABLET, COATED ORAL TWICE A DAY
Qty: 60 | Refills: 6 | Status: ON HOLD | COMMUNITY
Start: 2021-02-02

## 2021-12-16 RX ORDER — LIDOCAINE 50 MG/G
1 APPLICATION AS NEEDED CREAM TOPICAL
Qty: 15 GRAM | Refills: 3 | OUTPATIENT
Start: 2021-12-16 | End: 2022-01-25

## 2021-12-16 RX ORDER — ONDANSETRON 8 MG/1
1 TABLET ON THE TONGUE AND ALLOW TO DISSOLVE  AS NEEDED TABLET, ORALLY DISINTEGRATING ORAL
Qty: 20 | Refills: 2 | Status: ON HOLD | COMMUNITY
Start: 2021-03-12

## 2021-12-17 ENCOUNTER — TELEPHONE ENCOUNTER (OUTPATIENT)
Dept: URBAN - METROPOLITAN AREA CLINIC 52 | Facility: CLINIC | Age: 41
End: 2021-12-17

## 2021-12-22 ENCOUNTER — TELEPHONE ENCOUNTER (OUTPATIENT)
Dept: URBAN - METROPOLITAN AREA CLINIC 92 | Facility: CLINIC | Age: 41
End: 2021-12-22

## 2021-12-22 RX ORDER — ONDANSETRON HYDROCHLORIDE 4 MG/1
1 TABLET TABLET, FILM COATED ORAL ONCE A DAY
Qty: 30 | Refills: 6 | Status: ON HOLD | COMMUNITY
Start: 2021-05-25

## 2021-12-22 RX ORDER — HYDROCORTISONE 25 MG/G
1 APPLICATION CREAM TOPICAL ONCE A DAY
Qty: 30 GRAM | Refills: 2 | OUTPATIENT
Start: 2021-12-22 | End: 2022-03-22

## 2021-12-22 RX ORDER — COLESEVELAM HYDROCHLORIDE 625 MG/1
1 TABLET TABLET, COATED ORAL TWICE A DAY
Qty: 60 | Refills: 6 | Status: ON HOLD | COMMUNITY
Start: 2021-02-02

## 2021-12-22 RX ORDER — MESALAMINE 500 MG/1
TAKE 2 CAPSULES BY MOUTH 4 TIMES A DAY CAPSULE ORAL
Qty: 240 CAPSULE | Refills: 11 | Status: ON HOLD | COMMUNITY

## 2021-12-22 RX ORDER — LIDOCAINE 50 MG/G
1 APPLICATION AS NEEDED CREAM TOPICAL
Qty: 15 GRAM | Refills: 3 | Status: ACTIVE | COMMUNITY
Start: 2021-12-16 | End: 2022-01-25

## 2021-12-22 RX ORDER — PANTOPRAZOLE SODIUM 40 MG/1
1 TABLET TABLET, DELAYED RELEASE ORAL ONCE A DAY
Qty: 30 | Refills: 11 | Status: ACTIVE | COMMUNITY
Start: 2021-05-25

## 2021-12-22 RX ORDER — SCOPALAMINE 1 MG/3D
1 PATCH TO SKIN BEHIND THE EAR AS NEEDED PATCH, EXTENDED RELEASE TRANSDERMAL
Qty: 5 | Refills: 6 | Status: ACTIVE | COMMUNITY

## 2021-12-22 RX ORDER — FAMOTIDINE 40 MG/1
1 TABLET AT BEDTIME TABLET, FILM COATED ORAL ONCE A DAY
Qty: 30 | Refills: 11 | Status: ACTIVE | COMMUNITY
Start: 2021-10-26

## 2021-12-22 RX ORDER — ONDANSETRON 8 MG/1
1 TABLET ON THE TONGUE AND ALLOW TO DISSOLVE  AS NEEDED TABLET, ORALLY DISINTEGRATING ORAL
Qty: 20 | Refills: 2 | Status: ON HOLD | COMMUNITY
Start: 2021-03-12

## 2021-12-22 RX ORDER — NORTRIPTYLINE HYDROCHLORIDE 25 MG/1
1 CAPSULE CAPSULE ORAL AT NIGHT
Qty: 30 | Refills: 11 | Status: ON HOLD | COMMUNITY
Start: 2021-01-13

## 2021-12-22 RX ORDER — PROMETHAZINE HYDROCHLORIDE 25 MG/1
1 TABLET AS NEEDED TABLET ORAL
Qty: 50 TABLET | Refills: 6 | Status: ACTIVE | COMMUNITY
Start: 2021-10-26 | End: 2022-05-24

## 2021-12-22 RX ORDER — DEXLANSOPRAZOLE 60 MG/1
TAKE 1 CAPSULE BY MOUTH EVERY DAY CAPSULE, DELAYED RELEASE ORAL
Qty: 30 | Refills: 8 | Status: ON HOLD | COMMUNITY

## 2022-03-11 ENCOUNTER — OFFICE VISIT (OUTPATIENT)
Dept: URBAN - METROPOLITAN AREA CLINIC 118 | Facility: CLINIC | Age: 42
End: 2022-03-11
Payer: MEDICARE

## 2022-03-11 ENCOUNTER — LAB OUTSIDE AN ENCOUNTER (OUTPATIENT)
Dept: URBAN - METROPOLITAN AREA CLINIC 118 | Facility: CLINIC | Age: 42
End: 2022-03-11

## 2022-03-11 DIAGNOSIS — R11.2 NAUSEA & VOMITING: ICD-10-CM

## 2022-03-11 DIAGNOSIS — Z98.84 HISTORY OF ROUX-EN-Y GASTRIC BYPASS: ICD-10-CM

## 2022-03-11 DIAGNOSIS — K21.9 GERD: ICD-10-CM

## 2022-03-11 DIAGNOSIS — R10.9 ABDOMINAL PAIN: ICD-10-CM

## 2022-03-11 PROCEDURE — 99214 OFFICE O/P EST MOD 30 MIN: CPT | Performed by: INTERNAL MEDICINE

## 2022-03-11 RX ORDER — PANTOPRAZOLE SODIUM 40 MG/1
1 TABLET TABLET, DELAYED RELEASE ORAL ONCE A DAY
Qty: 30 | Refills: 11 | Status: ACTIVE | COMMUNITY
Start: 2021-05-25

## 2022-03-11 RX ORDER — DEXLANSOPRAZOLE 60 MG/1
TAKE 1 CAPSULE BY MOUTH EVERY DAY CAPSULE, DELAYED RELEASE ORAL
Qty: 30 | Refills: 8 | Status: ON HOLD | COMMUNITY

## 2022-03-11 RX ORDER — ONDANSETRON HYDROCHLORIDE 4 MG/1
1 TABLET TABLET, FILM COATED ORAL ONCE A DAY
Status: ACTIVE | COMMUNITY

## 2022-03-11 RX ORDER — SCOPALAMINE 1 MG/3D
1 PATCH TO SKIN BEHIND THE EAR AS NEEDED PATCH, EXTENDED RELEASE TRANSDERMAL
Qty: 5 | Refills: 6 | Status: DISCONTINUED | COMMUNITY

## 2022-03-11 RX ORDER — FAMOTIDINE 40 MG/1
1 TABLET AT BEDTIME TABLET, FILM COATED ORAL ONCE A DAY
Qty: 30 | Refills: 11 | Status: DISCONTINUED | COMMUNITY
Start: 2021-10-26

## 2022-03-11 RX ORDER — ONDANSETRON HYDROCHLORIDE 4 MG/1
1 TABLET TABLET, FILM COATED ORAL ONCE A DAY
Qty: 30 | Refills: 6 | Status: ON HOLD | COMMUNITY
Start: 2021-05-25

## 2022-03-11 RX ORDER — COLESEVELAM HYDROCHLORIDE 625 MG/1
1 TABLET TABLET, COATED ORAL TWICE A DAY
Qty: 60 | Refills: 6 | Status: ON HOLD | COMMUNITY
Start: 2021-02-02

## 2022-03-11 RX ORDER — DICYCLOMINE HYDROCHLORIDE 20 MG/1
1 TABLET TABLET ORAL
Qty: 90 TABLET | Refills: 1 | OUTPATIENT
Start: 2022-03-11 | End: 2022-05-10

## 2022-03-11 RX ORDER — NORTRIPTYLINE HYDROCHLORIDE 25 MG/1
1 CAPSULE CAPSULE ORAL AT NIGHT
Qty: 30 | Refills: 11 | Status: ON HOLD | COMMUNITY
Start: 2021-01-13

## 2022-03-11 RX ORDER — MESALAMINE 500 MG/1
TAKE 2 CAPSULES BY MOUTH 4 TIMES A DAY CAPSULE ORAL
Qty: 240 CAPSULE | Refills: 11 | Status: ON HOLD | COMMUNITY

## 2022-03-11 RX ORDER — ONDANSETRON 8 MG/1
1 TABLET ON THE TONGUE AND ALLOW TO DISSOLVE  AS NEEDED TABLET, ORALLY DISINTEGRATING ORAL
Qty: 20 | Refills: 2 | Status: ON HOLD | COMMUNITY
Start: 2021-03-12

## 2022-03-11 RX ORDER — PROMETHAZINE HYDROCHLORIDE 25 MG/1
1 TABLET AS NEEDED TABLET ORAL
Qty: 50 TABLET | Refills: 6 | Status: DISCONTINUED | COMMUNITY
Start: 2021-10-26 | End: 2022-05-24

## 2022-03-11 RX ORDER — HYDROCORTISONE 25 MG/G
1 APPLICATION CREAM TOPICAL ONCE A DAY
Qty: 30 GRAM | Refills: 2 | Status: DISCONTINUED | COMMUNITY
Start: 2021-12-22 | End: 2022-03-22

## 2022-03-11 NOTE — PHYSICAL EXAM GASTROINTESTINAL
Abdomen, soft, nontender, nondistended, no guarding or rigidity, no masses palpable, normal bowel sounds, Liver and Spleen, no hepatomegaly present, no hepatosplenomegaly, liver nontender, spleen not palpable,

## 2022-03-11 NOTE — HPI-TODAY'S VISIT:
This is a 40 yo female with pmh of gastric bypass, CCY, hysterectomy followed by Dr. Murphy for chronic abdominal pain, nausea/vomiting, and suspected Crohns disease here after a recent ED visit for left flank pain.  Patient states she has been seeing Dr. Murphy and has had EGD/colonoscopy in 2021. Was told she may have Crohns disease and was prescribed pentasa, which was not started due to insurance coverage.  Describes intermittent episodes of diffuse cramping abdominal pain. Also has chronic nausea/vomiting multiple times a week. Her symptoms have been since her gastric bypass surgery.  She developed more left flank pain and was see at [facility] ED recently. CT stone protocol without contrast was unremarkable.

## 2022-03-19 PROBLEM — 235595009 GASTROESOPHAGEAL REFLUX DISEASE: Status: ACTIVE | Noted: 2020-12-28

## 2022-04-18 ENCOUNTER — ERX REFILL RESPONSE (OUTPATIENT)
Dept: URBAN - METROPOLITAN AREA CLINIC 118 | Facility: CLINIC | Age: 42
End: 2022-04-18

## 2022-04-18 RX ORDER — DICYCLOMINE HYDROCHLORIDE 20 MG/1
1 TABLET TABLET ORAL
Qty: 90 TABLET | Refills: 1 | OUTPATIENT

## 2022-04-18 RX ORDER — DICYCLOMINE HYDROCHLORIDE 20 MG/1
1 TABLET ORALLY THREE TIMES A DAY AS NEEDED 30 DAYS TABLET ORAL
Qty: 90 TABLET | Refills: 2 | OUTPATIENT

## 2022-05-23 ENCOUNTER — ERX REFILL RESPONSE (OUTPATIENT)
Dept: URBAN - METROPOLITAN AREA CLINIC 118 | Facility: CLINIC | Age: 42
End: 2022-05-23

## 2022-05-23 RX ORDER — DICYCLOMINE HYDROCHLORIDE 20 MG/1
1 TABLET ORALLY THREE TIMES A DAY AS NEEDED 30 DAYS TABLET ORAL
Qty: 90 TABLET | Refills: 2 | OUTPATIENT

## 2022-06-22 ENCOUNTER — ERX REFILL RESPONSE (OUTPATIENT)
Dept: URBAN - METROPOLITAN AREA CLINIC 118 | Facility: CLINIC | Age: 42
End: 2022-06-22

## 2022-06-22 ENCOUNTER — TELEPHONE ENCOUNTER (OUTPATIENT)
Dept: URBAN - METROPOLITAN AREA CLINIC 92 | Facility: CLINIC | Age: 42
End: 2022-06-22

## 2022-06-22 RX ORDER — MESALAMINE 500 MG/1
TAKE 2 CAPSULES BY MOUTH 4 TIMES A DAY CAPSULE ORAL
Qty: 240 CAPSULE | Refills: 11 | Status: ON HOLD | COMMUNITY

## 2022-06-22 RX ORDER — NORTRIPTYLINE HYDROCHLORIDE 25 MG/1
1 CAPSULE CAPSULE ORAL AT NIGHT
Qty: 30 | Refills: 11 | Status: ON HOLD | COMMUNITY
Start: 2021-01-13

## 2022-06-22 RX ORDER — COLESEVELAM HYDROCHLORIDE 625 MG/1
1 TABLET TABLET, COATED ORAL TWICE A DAY
Qty: 60 | Refills: 6 | Status: ON HOLD | COMMUNITY
Start: 2021-02-02

## 2022-06-22 RX ORDER — PANTOPRAZOLE SODIUM 40 MG/1
1 TABLET TABLET, DELAYED RELEASE ORAL ONCE A DAY
Qty: 30 | Refills: 11 | OUTPATIENT
Start: 2022-06-22

## 2022-06-22 RX ORDER — ONDANSETRON 8 MG/1
1 TABLET ON THE TONGUE AND ALLOW TO DISSOLVE  AS NEEDED TABLET, ORALLY DISINTEGRATING ORAL
Qty: 20 | Refills: 2 | Status: ON HOLD | COMMUNITY
Start: 2021-03-12

## 2022-06-22 RX ORDER — DICYCLOMINE HYDROCHLORIDE 20 MG/1
1 TABLET ORALLY THREE TIMES A DAY AS NEEDED 30 DAYS TABLET ORAL
Qty: 90 TABLET | Refills: 2 | OUTPATIENT

## 2022-06-22 RX ORDER — DICYCLOMINE HYDROCHLORIDE 20 MG/1
1 TABLET ORALLY THREE TIMES A DAY AS NEEDED 30 DAYS TABLET ORAL
Qty: 90 TABLET | Refills: 2 | Status: ACTIVE | COMMUNITY

## 2022-06-22 RX ORDER — ONDANSETRON HYDROCHLORIDE 4 MG/1
1 TABLET TABLET, FILM COATED ORAL ONCE A DAY
Qty: 30 | Refills: 6 | Status: ON HOLD | COMMUNITY
Start: 2021-05-25

## 2022-06-22 RX ORDER — ONDANSETRON HYDROCHLORIDE 4 MG/1
1 TABLET TABLET, FILM COATED ORAL ONCE A DAY
Status: ACTIVE | COMMUNITY

## 2022-06-22 RX ORDER — DICYCLOMINE HYDROCHLORIDE 20 MG/1
TAKE 1 TABLET BY MOUTH THREE TIMES A DAY AS NEEDED FOR 30 DAYS TABLET ORAL
Qty: 90 TABLET | Refills: 1 | OUTPATIENT

## 2022-06-22 RX ORDER — PANTOPRAZOLE SODIUM 40 MG/1
1 TABLET TABLET, DELAYED RELEASE ORAL ONCE A DAY
Qty: 30 | Refills: 11 | Status: ACTIVE | COMMUNITY
Start: 2021-05-25

## 2022-06-22 RX ORDER — DEXLANSOPRAZOLE 60 MG/1
TAKE 1 CAPSULE BY MOUTH EVERY DAY CAPSULE, DELAYED RELEASE ORAL
Qty: 30 | Refills: 8 | Status: ON HOLD | COMMUNITY

## 2022-07-05 ENCOUNTER — ERX REFILL RESPONSE (OUTPATIENT)
Dept: URBAN - METROPOLITAN AREA CLINIC 118 | Facility: CLINIC | Age: 42
End: 2022-07-05

## 2022-07-05 RX ORDER — DICYCLOMINE HYDROCHLORIDE 20 MG/1
TAKE 1 TABLET BY MOUTH THREE TIMES A DAY AS NEEDED FOR 30 DAYS TABLET ORAL
Qty: 90 TABLET | Refills: 1 | OUTPATIENT

## 2022-07-05 RX ORDER — DICYCLOMINE HYDROCHLORIDE 20 MG/1
TAKE 1 TABLET BY MOUTH THREE TIMES A DAY AS NEEDED TABLET ORAL
Qty: 270 TABLET | Refills: 1 | OUTPATIENT

## 2022-09-12 ENCOUNTER — OFFICE VISIT (OUTPATIENT)
Dept: URBAN - METROPOLITAN AREA CLINIC 52 | Facility: CLINIC | Age: 42
End: 2022-09-12

## 2022-09-12 RX ORDER — ONDANSETRON 8 MG/1
1 TABLET ON THE TONGUE AND ALLOW TO DISSOLVE  AS NEEDED TABLET, ORALLY DISINTEGRATING ORAL
Qty: 20 | Refills: 2 | Status: ON HOLD | COMMUNITY
Start: 2021-03-12

## 2022-09-12 RX ORDER — DICYCLOMINE HYDROCHLORIDE 20 MG/1
TAKE 1 TABLET BY MOUTH THREE TIMES A DAY AS NEEDED TABLET ORAL
Qty: 270 TABLET | Refills: 1 | Status: ACTIVE | COMMUNITY

## 2022-09-12 RX ORDER — ONDANSETRON HYDROCHLORIDE 4 MG/1
1 TABLET TABLET, FILM COATED ORAL ONCE A DAY
Qty: 30 | Refills: 6 | Status: ON HOLD | COMMUNITY
Start: 2021-05-25

## 2022-09-12 RX ORDER — COLESEVELAM HYDROCHLORIDE 625 MG/1
1 TABLET TABLET, COATED ORAL TWICE A DAY
Qty: 60 | Refills: 6 | Status: ON HOLD | COMMUNITY
Start: 2021-02-02

## 2022-09-12 RX ORDER — NORTRIPTYLINE HYDROCHLORIDE 25 MG/1
1 CAPSULE CAPSULE ORAL AT NIGHT
Qty: 30 | Refills: 11 | Status: ON HOLD | COMMUNITY
Start: 2021-01-13

## 2022-09-12 RX ORDER — ONDANSETRON HYDROCHLORIDE 4 MG/1
1 TABLET TABLET, FILM COATED ORAL ONCE A DAY
Status: ACTIVE | COMMUNITY

## 2022-09-12 RX ORDER — PANTOPRAZOLE SODIUM 40 MG/1
1 TABLET TABLET, DELAYED RELEASE ORAL ONCE A DAY
Qty: 30 | Refills: 11 | Status: ACTIVE | COMMUNITY
Start: 2021-05-25

## 2022-09-12 RX ORDER — PANTOPRAZOLE SODIUM 40 MG/1
1 TABLET TABLET, DELAYED RELEASE ORAL ONCE A DAY
Qty: 30 | Refills: 11 | Status: ACTIVE | COMMUNITY
Start: 2022-06-22

## 2022-09-12 RX ORDER — DEXLANSOPRAZOLE 60 MG/1
TAKE 1 CAPSULE BY MOUTH EVERY DAY CAPSULE, DELAYED RELEASE ORAL
Qty: 30 | Refills: 8 | Status: ON HOLD | COMMUNITY

## 2022-09-12 RX ORDER — MESALAMINE 500 MG/1
TAKE 2 CAPSULES BY MOUTH 4 TIMES A DAY CAPSULE ORAL
Qty: 240 CAPSULE | Refills: 11 | Status: ON HOLD | COMMUNITY

## 2022-09-14 ENCOUNTER — OFFICE VISIT (OUTPATIENT)
Dept: URBAN - METROPOLITAN AREA CLINIC 52 | Facility: CLINIC | Age: 42
End: 2022-09-14
Payer: MEDICARE

## 2022-09-14 ENCOUNTER — OFFICE VISIT (OUTPATIENT)
Dept: URBAN - METROPOLITAN AREA CLINIC 52 | Facility: CLINIC | Age: 42
End: 2022-09-14

## 2022-09-14 ENCOUNTER — WEB ENCOUNTER (OUTPATIENT)
Dept: URBAN - METROPOLITAN AREA CLINIC 52 | Facility: CLINIC | Age: 42
End: 2022-09-14

## 2022-09-14 ENCOUNTER — LAB OUTSIDE AN ENCOUNTER (OUTPATIENT)
Dept: URBAN - METROPOLITAN AREA CLINIC 52 | Facility: CLINIC | Age: 42
End: 2022-09-14

## 2022-09-14 ENCOUNTER — TELEPHONE ENCOUNTER (OUTPATIENT)
Dept: URBAN - METROPOLITAN AREA CLINIC 92 | Facility: CLINIC | Age: 42
End: 2022-09-14

## 2022-09-14 VITALS
DIASTOLIC BLOOD PRESSURE: 79 MMHG | TEMPERATURE: 98.8 F | HEART RATE: 110 BPM | HEIGHT: 67 IN | SYSTOLIC BLOOD PRESSURE: 127 MMHG | BODY MASS INDEX: 22.29 KG/M2 | WEIGHT: 142 LBS

## 2022-09-14 DIAGNOSIS — R10.13 EPIGASTRIC PAIN: ICD-10-CM

## 2022-09-14 DIAGNOSIS — K31.84 GASTROPARESIS: ICD-10-CM

## 2022-09-14 DIAGNOSIS — R10.84 GENERALIZED ABDOMINAL PAIN: ICD-10-CM

## 2022-09-14 DIAGNOSIS — R14.0 BLOATING: ICD-10-CM

## 2022-09-14 DIAGNOSIS — R11.2 NAUSEA AND VOMITING, UNSPECIFIED VOMITING TYPE: ICD-10-CM

## 2022-09-14 PROCEDURE — 99214 OFFICE O/P EST MOD 30 MIN: CPT | Performed by: PHYSICIAN ASSISTANT

## 2022-09-14 RX ORDER — ONDANSETRON HYDROCHLORIDE 4 MG/1
1 TABLET TABLET, FILM COATED ORAL ONCE A DAY
Status: ACTIVE | COMMUNITY

## 2022-09-14 RX ORDER — DICYCLOMINE HYDROCHLORIDE 20 MG/1
1 TABLET TABLET ORAL
Qty: 120 | Refills: 1 | OUTPATIENT
Start: 2022-09-14 | End: 2022-11-12

## 2022-09-14 RX ORDER — ONDANSETRON HYDROCHLORIDE 4 MG/1
1 TABLET TABLET, FILM COATED ORAL
Qty: 90 | Refills: 2

## 2022-09-14 RX ORDER — PANTOPRAZOLE SODIUM 40 MG/1
1 TABLET TABLET, DELAYED RELEASE ORAL ONCE A DAY
Qty: 30 | Refills: 11 | Status: ACTIVE | COMMUNITY
Start: 2021-05-25

## 2022-09-14 RX ORDER — PROMETHAZINE HYDROCHLORIDE 25 MG/1
1 TABLET TABLET ORAL
Qty: 30 | Refills: 2 | OUTPATIENT
Start: 2022-09-14 | End: 2022-10-29

## 2022-09-14 RX ORDER — SCOPOLAMINE 1 MG/3D
1 PATCH TO SKIN BEHIND THE EAR PATCH TRANSDERMAL
Qty: 10 | Refills: 2 | OUTPATIENT
Start: 2022-09-14 | End: 2022-10-29

## 2022-09-14 RX ORDER — FAMOTIDINE 20 MG/1
1 TABLET AT BEDTIME AS NEEDED TABLET, FILM COATED ORAL ONCE A DAY
Status: ACTIVE | COMMUNITY

## 2022-09-14 RX ORDER — HYOSCYAMINE SULFATE 0.12 MG/1
1 TABLET TABLET ORAL
Qty: 90 | Refills: 2 | OUTPATIENT
Start: 2022-09-14 | End: 2022-12-12

## 2022-09-14 RX ORDER — DICYCLOMINE HYDROCHLORIDE 20 MG/1
TAKE 1 TABLET BY MOUTH THREE TIMES A DAY AS NEEDED TABLET ORAL
Qty: 270 TABLET | Refills: 1 | Status: ON HOLD | COMMUNITY

## 2022-09-14 NOTE — PHYSICAL EXAM SKIN:
no suspicious lesions, no jaundice present  PAST SURGICAL HISTORY:  No significant past surgical history

## 2022-09-14 NOTE — PHYSICAL EXAM NECK/THYROID:
Scheduled pt 3 month follow up on 3/8/2021. Sent out reminder letter.      ----- Message from Keerthi Murphy MD sent at 12/7/2020  8:53 PM CST -----  Please schedule pt for f/u in 3 months.     normal appearance, trachea midline

## 2022-09-14 NOTE — PHYSICAL EXAM GASTROINTESTINAL
Abdomen , soft, nondistended, epigastric tenderness on palpation,  no guarding or rigidity , no masses palpable , normal bowel sounds

## 2022-09-14 NOTE — HPI-TODAY'S VISIT:
This is a 41 y.o. female with h/o GERD, gastric bypass, and questionable gastoparesis. She continues to have daily nausea, frequent vomiting, and abdmoinal bloating. Also report hard, painful bowel movements and says she has a movement 1-2 a week.  Last colonoscopy 5/21/21 (Dr. Murphy) with 3-mm sigmoid polyp (hyperplastic); 4-mm proximal ascending colon polyp (TA) and 10-mm proximal ascending colon polyp (sessile serrated adenoma/polyp overlying a submucosal lipoma). Random bx negative for active or chronic ileitis, and no active, chronic, or microscopic colitis.  Last EGD 6/9/21 (Dr. Murphy) with normal esophagus, William-en-Y gastrojejunostomy with gastrojejunal anastomosis characterized by congestion; medium amount of food residue in stomach suggestive of gastroparesis; normal jejunum. No specimens. Recommended patietn repeat EGD in 1 year.

## 2022-09-21 ENCOUNTER — CLAIMS CREATED FROM THE CLAIM WINDOW (OUTPATIENT)
Dept: URBAN - METROPOLITAN AREA CLINIC 4 | Facility: CLINIC | Age: 42
End: 2022-09-21
Payer: MEDICARE

## 2022-09-21 ENCOUNTER — CLAIMS CREATED FROM THE CLAIM WINDOW (OUTPATIENT)
Dept: URBAN - METROPOLITAN AREA SURGERY CENTER 23 | Facility: SURGERY CENTER | Age: 42
End: 2022-09-21
Payer: MEDICARE

## 2022-09-21 ENCOUNTER — CLAIMS CREATED FROM THE CLAIM WINDOW (OUTPATIENT)
Dept: URBAN - METROPOLITAN AREA SURGERY CENTER 23 | Facility: SURGERY CENTER | Age: 42
End: 2022-09-21

## 2022-09-21 DIAGNOSIS — K31.89 OTHER DISEASES OF STOMACH AND DUODENUM: ICD-10-CM

## 2022-09-21 DIAGNOSIS — K22.89 OTHER SPECIFIED DISEASE OF ESOPHAGUS: ICD-10-CM

## 2022-09-21 DIAGNOSIS — R10.13 ABDOMINAL DISCOMFORT, EPIGASTRIC: ICD-10-CM

## 2022-09-21 DIAGNOSIS — K31.89 ACQUIRED DEFORMITY OF DUODENUM: ICD-10-CM

## 2022-09-21 DIAGNOSIS — R11.0 AM NAUSEA: ICD-10-CM

## 2022-09-21 DIAGNOSIS — K22.89 DILATATION OF ESOPHAGUS: ICD-10-CM

## 2022-09-21 PROCEDURE — 88305 TISSUE EXAM BY PATHOLOGIST: CPT | Performed by: PATHOLOGY

## 2022-09-21 PROCEDURE — 43239 EGD BIOPSY SINGLE/MULTIPLE: CPT | Performed by: INTERNAL MEDICINE

## 2022-09-21 PROCEDURE — 88312 SPECIAL STAINS GROUP 1: CPT | Performed by: PATHOLOGY

## 2022-09-21 PROCEDURE — G8907 PT DOC NO EVENTS ON DISCHARG: HCPCS | Performed by: INTERNAL MEDICINE

## 2022-09-21 RX ORDER — ONDANSETRON HYDROCHLORIDE 4 MG/1
1 TABLET TABLET, FILM COATED ORAL
Qty: 90 | Refills: 2 | Status: ACTIVE | COMMUNITY

## 2022-09-21 RX ORDER — PROMETHAZINE HYDROCHLORIDE 25 MG/1
1 TABLET TABLET ORAL
Qty: 30 | Refills: 2 | Status: ACTIVE | COMMUNITY
Start: 2022-09-14 | End: 2022-10-29

## 2022-09-21 RX ORDER — PANTOPRAZOLE SODIUM 40 MG/1
1 TABLET TABLET, DELAYED RELEASE ORAL ONCE A DAY
Qty: 30 | Refills: 11 | Status: ACTIVE | COMMUNITY
Start: 2021-05-25

## 2022-09-21 RX ORDER — SCOPOLAMINE 1 MG/3D
1 PATCH TO SKIN BEHIND THE EAR PATCH TRANSDERMAL
Qty: 10 | Refills: 2 | Status: ACTIVE | COMMUNITY
Start: 2022-09-14 | End: 2022-10-29

## 2022-09-21 RX ORDER — DICYCLOMINE HYDROCHLORIDE 20 MG/1
1 TABLET TABLET ORAL
Qty: 120 | Refills: 1 | Status: ACTIVE | COMMUNITY
Start: 2022-09-14 | End: 2022-11-12

## 2022-09-21 RX ORDER — FAMOTIDINE 20 MG/1
1 TABLET AT BEDTIME AS NEEDED TABLET, FILM COATED ORAL ONCE A DAY
Status: ACTIVE | COMMUNITY

## 2022-09-21 RX ORDER — DICYCLOMINE HYDROCHLORIDE 20 MG/1
TAKE 1 TABLET BY MOUTH THREE TIMES A DAY AS NEEDED TABLET ORAL
Qty: 270 TABLET | Refills: 1 | Status: ON HOLD | COMMUNITY

## 2022-09-21 RX ORDER — HYOSCYAMINE SULFATE 0.12 MG/1
1 TABLET TABLET ORAL
Qty: 90 | Refills: 2 | Status: ACTIVE | COMMUNITY
Start: 2022-09-14 | End: 2022-12-12

## 2022-10-12 ENCOUNTER — OFFICE VISIT (OUTPATIENT)
Dept: URBAN - METROPOLITAN AREA CLINIC 52 | Facility: CLINIC | Age: 42
End: 2022-10-12

## 2022-10-12 RX ORDER — HYOSCYAMINE SULFATE 0.12 MG/1
1 TABLET TABLET ORAL
Qty: 90 | Refills: 2 | Status: ON HOLD | COMMUNITY
Start: 2022-09-14 | End: 2022-12-12

## 2022-10-12 RX ORDER — FAMOTIDINE 20 MG/1
1 TABLET AT BEDTIME AS NEEDED TABLET, FILM COATED ORAL ONCE A DAY
Status: ON HOLD | COMMUNITY

## 2022-10-12 RX ORDER — PANTOPRAZOLE SODIUM 40 MG/1
1 TABLET TABLET, DELAYED RELEASE ORAL ONCE A DAY
Qty: 30 | Refills: 11 | Status: ON HOLD | COMMUNITY
Start: 2021-05-25

## 2022-10-12 RX ORDER — DICYCLOMINE HYDROCHLORIDE 20 MG/1
TAKE 1 TABLET BY MOUTH THREE TIMES A DAY AS NEEDED TABLET ORAL
Qty: 270 TABLET | Refills: 1 | COMMUNITY

## 2022-10-12 RX ORDER — PROMETHAZINE HYDROCHLORIDE 25 MG/1
1 TABLET TABLET ORAL
Qty: 30 | Refills: 2 | Status: ON HOLD | COMMUNITY
Start: 2022-09-14 | End: 2022-10-29

## 2022-10-12 RX ORDER — SCOPOLAMINE 1 MG/3D
1 PATCH TO SKIN BEHIND THE EAR PATCH TRANSDERMAL
Qty: 10 | Refills: 2 | Status: ON HOLD | COMMUNITY
Start: 2022-09-14 | End: 2022-10-29

## 2022-10-12 RX ORDER — ONDANSETRON HYDROCHLORIDE 4 MG/1
1 TABLET TABLET, FILM COATED ORAL
Qty: 90 | Refills: 2 | Status: ON HOLD | COMMUNITY

## 2022-10-12 RX ORDER — DICYCLOMINE HYDROCHLORIDE 20 MG/1
1 TABLET TABLET ORAL
Qty: 120 | Refills: 1 | Status: ON HOLD | COMMUNITY
Start: 2022-09-14 | End: 2022-11-12

## 2022-10-14 ENCOUNTER — CLAIMS CREATED FROM THE CLAIM WINDOW (OUTPATIENT)
Dept: URBAN - METROPOLITAN AREA CLINIC 52 | Facility: CLINIC | Age: 42
End: 2022-10-14
Payer: MEDICARE

## 2022-10-14 VITALS
TEMPERATURE: 97.7 F | SYSTOLIC BLOOD PRESSURE: 110 MMHG | HEIGHT: 67 IN | BODY MASS INDEX: 21.97 KG/M2 | WEIGHT: 140 LBS | DIASTOLIC BLOOD PRESSURE: 80 MMHG | HEART RATE: 94 BPM | OXYGEN SATURATION: 96 %

## 2022-10-14 DIAGNOSIS — R11.2 NAUSEA AND VOMITING, UNSPECIFIED VOMITING TYPE: ICD-10-CM

## 2022-10-14 DIAGNOSIS — R10.84 GENERALIZED ABDOMINAL PAIN: ICD-10-CM

## 2022-10-14 DIAGNOSIS — K31.84 GASTROPARESIS: ICD-10-CM

## 2022-10-14 PROCEDURE — 99213 OFFICE O/P EST LOW 20 MIN: CPT | Performed by: INTERNAL MEDICINE

## 2022-10-14 PROCEDURE — 99213 OFFICE O/P EST LOW 20 MIN: CPT | Performed by: PHYSICIAN ASSISTANT

## 2022-10-14 RX ORDER — DICYCLOMINE HYDROCHLORIDE 20 MG/1
1 TABLET TABLET ORAL
Qty: 120 | Refills: 1 | Status: ACTIVE | COMMUNITY
Start: 2022-09-14 | End: 2022-11-12

## 2022-10-14 RX ORDER — ONDANSETRON HYDROCHLORIDE 4 MG/1
1 TABLET TABLET, FILM COATED ORAL
Qty: 90 | Refills: 2 | Status: ACTIVE | COMMUNITY

## 2022-10-14 RX ORDER — FAMOTIDINE 20 MG/1
1 TABLET AT BEDTIME AS NEEDED TABLET, FILM COATED ORAL ONCE A DAY
Status: ACTIVE | COMMUNITY

## 2022-10-14 RX ORDER — PROMETHAZINE HYDROCHLORIDE 25 MG/1
1 TABLET TABLET ORAL
Qty: 30 | Refills: 2 | Status: ACTIVE | COMMUNITY
Start: 2022-09-14 | End: 2022-10-29

## 2022-10-14 RX ORDER — SCOPOLAMINE 1 MG/3D
1 PATCH TO SKIN BEHIND THE EAR PATCH TRANSDERMAL
Qty: 10 | Refills: 2 | Status: ACTIVE | COMMUNITY
Start: 2022-09-14 | End: 2022-10-29

## 2022-10-14 RX ORDER — PANTOPRAZOLE SODIUM 40 MG/1
1 TABLET TABLET, DELAYED RELEASE ORAL ONCE A DAY
Qty: 30 | Refills: 11 | Status: ACTIVE | COMMUNITY
Start: 2021-05-25

## 2022-10-14 RX ORDER — HYOSCYAMINE SULFATE 0.12 MG/1
1 TABLET TABLET ORAL
Qty: 90 | Refills: 2 | Status: ACTIVE | COMMUNITY
Start: 2022-09-14 | End: 2022-12-12

## 2022-10-14 RX ORDER — METOCLOPRAMIDE 5 MG/1
1 TABLET 15 MINUTES BEFORE MEALS TABLET ORAL
Qty: 42 | Refills: 0 | OUTPATIENT
Start: 2022-10-14

## 2022-10-14 RX ORDER — DICYCLOMINE HYDROCHLORIDE 20 MG/1
TAKE 1 TABLET BY MOUTH THREE TIMES A DAY AS NEEDED TABLET ORAL
Qty: 270 TABLET | Refills: 1 | COMMUNITY

## 2022-10-14 NOTE — HPI-TODAY'S VISIT:
This is a 41 y.o. female with h/o GERD, gastric bypass, and gastroparesis. She continues to have daily nausea, frequent vomiting, and abdominal bloating. Also reports occasional regurgitation of food while sleeping.  Patient reports compliance with frequent, small meals. She does see a little more relief in her symptoms with Hyoscyamine vs. Dicyclomine, but still with abdominal pain.  GES 10/3/2022 was abnormal.  EGD 9/21/2022 (Dr. Filemon Brown) with gastritis, food residue in stomach, and gastric bypass with a medium-sized pouch. Pathology unremarkable.  Last colonoscopy 5/21/21 (Dr. Murphy) with 3-mm sigmoid polyp (hyperplastic); 4-mm proximal ascending colon polyp (TA) and 10-mm proximal ascending colon polyp (sessile serrated adenoma/polyp overlying a submucosal lipoma). Random bx negative for active or chronic ileitis, and no active, chronic, or microscopic colitis.

## 2022-10-21 ENCOUNTER — OFFICE VISIT (OUTPATIENT)
Dept: URBAN - METROPOLITAN AREA CLINIC 52 | Facility: CLINIC | Age: 42
End: 2022-10-21

## 2022-10-27 ENCOUNTER — TELEPHONE ENCOUNTER (OUTPATIENT)
Dept: URBAN - METROPOLITAN AREA CLINIC 52 | Facility: CLINIC | Age: 42
End: 2022-10-27

## 2022-10-31 ENCOUNTER — LAB OUTSIDE AN ENCOUNTER (OUTPATIENT)
Dept: URBAN - METROPOLITAN AREA CLINIC 52 | Facility: CLINIC | Age: 42
End: 2022-10-31

## 2022-10-31 ENCOUNTER — CLAIMS CREATED FROM THE CLAIM WINDOW (OUTPATIENT)
Dept: URBAN - METROPOLITAN AREA CLINIC 52 | Facility: CLINIC | Age: 42
End: 2022-10-31
Payer: MEDICARE

## 2022-10-31 VITALS
DIASTOLIC BLOOD PRESSURE: 70 MMHG | SYSTOLIC BLOOD PRESSURE: 110 MMHG | WEIGHT: 141.8 LBS | HEIGHT: 67 IN | HEART RATE: 92 BPM | TEMPERATURE: 98.4 F | BODY MASS INDEX: 22.26 KG/M2 | OXYGEN SATURATION: 98 %

## 2022-10-31 DIAGNOSIS — R10.31 RIGHT LOWER QUADRANT PAIN: ICD-10-CM

## 2022-10-31 DIAGNOSIS — R13.10 DYSPHAGIA, UNSPECIFIED TYPE: ICD-10-CM

## 2022-10-31 DIAGNOSIS — Z86.010 HISTORY OF COLON POLYPS: ICD-10-CM

## 2022-10-31 DIAGNOSIS — R10.32 LEFT LOWER QUADRANT PAIN: ICD-10-CM

## 2022-10-31 DIAGNOSIS — K58.8 OTHER IRRITABLE BOWEL SYNDROME: ICD-10-CM

## 2022-10-31 DIAGNOSIS — K31.84 GASTROPARESIS: ICD-10-CM

## 2022-10-31 DIAGNOSIS — K21.9 GASTROESOPHAGEAL REFLUX DISEASE WITHOUT ESOPHAGITIS: ICD-10-CM

## 2022-10-31 PROBLEM — 10743008: Status: ACTIVE | Noted: 2022-10-31

## 2022-10-31 PROBLEM — 266435005: Status: ACTIVE | Noted: 2022-06-22

## 2022-10-31 PROBLEM — 235675006: Status: ACTIVE | Noted: 2022-09-14

## 2022-10-31 PROBLEM — 40739000: Status: ACTIVE | Noted: 2022-10-31

## 2022-10-31 PROCEDURE — 99214 OFFICE O/P EST MOD 30 MIN: CPT | Performed by: PHYSICIAN ASSISTANT

## 2022-10-31 PROCEDURE — 99214 OFFICE O/P EST MOD 30 MIN: CPT | Performed by: INTERNAL MEDICINE

## 2022-10-31 RX ORDER — PHENOBARBITAL, HYOSCYAMINE SULFATE, ATROPINE SULFATE, SCOPOLAMINE HYDROBROMIDE 16.2; .1037; .0194; .0065 MG/1; MG/1; MG/1; MG/1
1 TABLET TABLET ORAL THREE TIMES A DAY
Qty: 90 | Refills: 0 | OUTPATIENT
Start: 2022-10-31 | End: 2022-11-29

## 2022-10-31 RX ORDER — PANTOPRAZOLE SODIUM 40 MG/1
1 TABLET TABLET, DELAYED RELEASE ORAL ONCE A DAY
Qty: 30 | Refills: 11 | Status: ACTIVE | COMMUNITY
Start: 2021-05-25

## 2022-10-31 RX ORDER — FAMOTIDINE 20 MG/1
1 TABLET AT BEDTIME AS NEEDED TABLET, FILM COATED ORAL ONCE A DAY
Status: ACTIVE | COMMUNITY

## 2022-10-31 RX ORDER — DICYCLOMINE HYDROCHLORIDE 20 MG/1
1 TABLET TABLET ORAL
Qty: 120 | Refills: 1 | Status: ON HOLD | COMMUNITY
Start: 2022-09-14 | End: 2022-11-12

## 2022-10-31 RX ORDER — DICYCLOMINE HYDROCHLORIDE 20 MG/1
TAKE 1 TABLET BY MOUTH THREE TIMES A DAY AS NEEDED TABLET ORAL
Qty: 270 TABLET | Refills: 1 | COMMUNITY

## 2022-10-31 RX ORDER — HYOSCYAMINE SULFATE 0.12 MG/1
1 TABLET TABLET ORAL
Qty: 90 | Refills: 2 | Status: ON HOLD | COMMUNITY
Start: 2022-09-14 | End: 2022-12-12

## 2022-10-31 RX ORDER — ONDANSETRON HYDROCHLORIDE 4 MG/1
1 TABLET TABLET, FILM COATED ORAL
Qty: 90 | Refills: 2 | Status: ACTIVE | COMMUNITY

## 2022-10-31 RX ORDER — METOCLOPRAMIDE 5 MG/1
1 TABLET 15 MINUTES BEFORE MEALS TABLET ORAL
Qty: 42 | Refills: 0 | Status: ON HOLD | COMMUNITY
Start: 2022-10-14

## 2022-10-31 NOTE — PHYSICAL EXAM GASTROINTESTINAL
Abdomen , soft, nondistended, nontender,  no guarding or rigidity , no masses palpable , normal bowel sounds

## 2022-10-31 NOTE — HPI-TODAY'S VISIT:
This is a 41 y.o. female with h/o GERD, gastric bypass, and gastroparesis. She continues to have daily nausea, frequent vomiting, and abdominal bloating. Also reports occasional regurgitation of food while sleeping.  Patient reports compliance with frequent, small meals. She also has severe, lower abdominal contraction type pains. She has tried and failed Hyoscyamine, Dicyclomine, gastroparesis diet, low FODMAP diet.  On her last office visit, she was given a 2 week trial of Reglan, and states she saw no improvment with the medication. She did report some involuntary muscle movements while on Reglan.  GES 10/3/2022 was abnormal.  EGD 9/21/2022 (Dr. Filemon Brown) with gastritis, food residue in stomach, and gastric bypass with a medium-sized pouch. Pathology unremarkable.  Last colonoscopy 5/21/21 (Dr. Murphy) with 3-mm sigmoid polyp (hyperplastic); 4-mm proximal ascending colon polyp (TA) and 10-mm proximal ascending colon polyp (sessile serrated adenoma/polyp overlying a submucosal lipoma). Random bx negative for active or chronic ileitis, and no active, chronic, or microscopic colitis.

## 2022-11-01 ENCOUNTER — TELEPHONE ENCOUNTER (OUTPATIENT)
Dept: URBAN - METROPOLITAN AREA CLINIC 94 | Facility: CLINIC | Age: 42
End: 2022-11-01

## 2022-11-02 ENCOUNTER — TELEPHONE ENCOUNTER (OUTPATIENT)
Dept: URBAN - METROPOLITAN AREA CLINIC 52 | Facility: CLINIC | Age: 42
End: 2022-11-02

## 2022-11-02 PROBLEM — 428283002: Status: ACTIVE | Noted: 2021-04-15

## 2022-11-23 ENCOUNTER — CLAIMS CREATED FROM THE CLAIM WINDOW (OUTPATIENT)
Dept: URBAN - METROPOLITAN AREA SURGERY CENTER 17 | Facility: SURGERY CENTER | Age: 42
End: 2022-11-23
Payer: MEDICARE

## 2022-11-23 ENCOUNTER — CLAIMS CREATED FROM THE CLAIM WINDOW (OUTPATIENT)
Dept: URBAN - METROPOLITAN AREA CLINIC 4 | Facility: CLINIC | Age: 42
End: 2022-11-23
Payer: MEDICARE

## 2022-11-23 ENCOUNTER — CLAIMS CREATED FROM THE CLAIM WINDOW (OUTPATIENT)
Dept: URBAN - METROPOLITAN AREA SURGERY CENTER 17 | Facility: SURGERY CENTER | Age: 42
End: 2022-11-23

## 2022-11-23 DIAGNOSIS — R10.32 ABDOMINAL CRAMPING IN LEFT LOWER QUADRANT: ICD-10-CM

## 2022-11-23 DIAGNOSIS — K63.89 BACTERIAL OVERGROWTH SYNDROME: ICD-10-CM

## 2022-11-23 DIAGNOSIS — D12.0 BENIGN NEOPLASM OF CECUM: ICD-10-CM

## 2022-11-23 DIAGNOSIS — D12.0 ADENOMA OF CECUM: ICD-10-CM

## 2022-11-23 PROCEDURE — 88305 TISSUE EXAM BY PATHOLOGIST: CPT | Performed by: PATHOLOGY

## 2022-11-23 PROCEDURE — G8907 PT DOC NO EVENTS ON DISCHARG: HCPCS | Performed by: INTERNAL MEDICINE

## 2022-11-23 PROCEDURE — 45385 COLONOSCOPY W/LESION REMOVAL: CPT | Performed by: INTERNAL MEDICINE

## 2022-11-23 PROCEDURE — 45380 COLONOSCOPY AND BIOPSY: CPT | Performed by: INTERNAL MEDICINE

## 2022-12-14 ENCOUNTER — TELEPHONE ENCOUNTER (OUTPATIENT)
Dept: URBAN - METROPOLITAN AREA CLINIC 94 | Facility: CLINIC | Age: 42
End: 2022-12-14

## 2022-12-14 PROBLEM — 235595009: Status: ACTIVE | Noted: 2021-10-26

## 2022-12-14 RX ORDER — DICYCLOMINE HYDROCHLORIDE 20 MG/1
TAKE 1 TABLET BY MOUTH THREE TIMES A DAY AS NEEDED TABLET ORAL
Qty: 270 TABLET | Refills: 1 | COMMUNITY

## 2022-12-14 RX ORDER — FAMOTIDINE 20 MG/1
1 TABLET AT BEDTIME AS NEEDED TABLET, FILM COATED ORAL ONCE A DAY
Status: ACTIVE | COMMUNITY

## 2022-12-14 RX ORDER — ONDANSETRON HYDROCHLORIDE 4 MG/1
1 TABLET TABLET, FILM COATED ORAL
Qty: 90 | Refills: 2 | Status: ACTIVE | COMMUNITY

## 2022-12-14 RX ORDER — PANTOPRAZOLE SODIUM 40 MG/1
1 TABLET TABLET, DELAYED RELEASE ORAL ONCE A DAY
Qty: 30 | Refills: 11 | Status: ACTIVE | COMMUNITY
Start: 2021-05-25

## 2022-12-14 RX ORDER — METOCLOPRAMIDE 5 MG/1
1 TABLET 15 MINUTES BEFORE MEALS TABLET ORAL
Qty: 42 | Refills: 0 | Status: ON HOLD | COMMUNITY
Start: 2022-10-14

## 2022-12-14 RX ORDER — FAMOTIDINE 40 MG/1
1 TABLET TABLET, FILM COATED ORAL AT BEDTIME
Qty: 30 | Refills: 4 | OUTPATIENT
Start: 2022-12-14

## 2023-02-16 ENCOUNTER — TELEPHONE ENCOUNTER (OUTPATIENT)
Dept: URBAN - METROPOLITAN AREA CLINIC 52 | Facility: CLINIC | Age: 43
End: 2023-02-16

## 2023-02-23 ENCOUNTER — TELEPHONE ENCOUNTER (OUTPATIENT)
Dept: URBAN - METROPOLITAN AREA CLINIC 52 | Facility: CLINIC | Age: 43
End: 2023-02-23

## 2023-10-11 ENCOUNTER — TELEPHONE ENCOUNTER (OUTPATIENT)
Dept: URBAN - METROPOLITAN AREA CLINIC 52 | Facility: CLINIC | Age: 43
End: 2023-10-11

## 2023-10-27 ENCOUNTER — OFFICE VISIT (OUTPATIENT)
Dept: URBAN - METROPOLITAN AREA CLINIC 52 | Facility: CLINIC | Age: 43
End: 2023-10-27

## 2023-11-15 ENCOUNTER — OFFICE VISIT (OUTPATIENT)
Dept: URBAN - METROPOLITAN AREA CLINIC 52 | Facility: CLINIC | Age: 43
End: 2023-11-15

## 2023-11-15 RX ORDER — DICYCLOMINE HYDROCHLORIDE 20 MG/1
TAKE 1 TABLET BY MOUTH THREE TIMES A DAY AS NEEDED TABLET ORAL
Qty: 270 TABLET | Refills: 1 | COMMUNITY

## 2023-11-15 RX ORDER — PANTOPRAZOLE SODIUM 40 MG/1
1 TABLET TABLET, DELAYED RELEASE ORAL ONCE A DAY
Qty: 30 | Refills: 11 | Status: ACTIVE | COMMUNITY
Start: 2021-05-25

## 2023-11-15 RX ORDER — FAMOTIDINE 40 MG/1
1 TABLET TABLET, FILM COATED ORAL AT BEDTIME
Qty: 30 | Refills: 4 | Status: ACTIVE | COMMUNITY
Start: 2022-12-14

## 2023-11-15 RX ORDER — ONDANSETRON HYDROCHLORIDE 4 MG/1
1 TABLET TABLET, FILM COATED ORAL
Qty: 90 | Refills: 2 | Status: ACTIVE | COMMUNITY

## 2023-11-15 RX ORDER — FAMOTIDINE 20 MG/1
1 TABLET AT BEDTIME AS NEEDED TABLET, FILM COATED ORAL ONCE A DAY
Status: ACTIVE | COMMUNITY

## 2023-11-15 RX ORDER — METOCLOPRAMIDE 5 MG/1
1 TABLET 15 MINUTES BEFORE MEALS TABLET ORAL
Qty: 42 | Refills: 0 | Status: ON HOLD | COMMUNITY
Start: 2022-10-14

## 2024-04-24 ENCOUNTER — LAB (OUTPATIENT)
Dept: URBAN - METROPOLITAN AREA CLINIC 52 | Facility: CLINIC | Age: 44
End: 2024-04-24

## 2024-04-24 ENCOUNTER — OV EP (OUTPATIENT)
Dept: URBAN - METROPOLITAN AREA CLINIC 52 | Facility: CLINIC | Age: 44
End: 2024-04-24
Payer: MEDICARE

## 2024-04-24 VITALS
BODY MASS INDEX: 26.53 KG/M2 | HEART RATE: 85 BPM | SYSTOLIC BLOOD PRESSURE: 148 MMHG | TEMPERATURE: 97.5 F | OXYGEN SATURATION: 98 % | HEIGHT: 67 IN | WEIGHT: 169 LBS | DIASTOLIC BLOOD PRESSURE: 87 MMHG

## 2024-04-24 DIAGNOSIS — R11.2 NAUSEA AND VOMITING, UNSPECIFIED VOMITING TYPE: ICD-10-CM

## 2024-04-24 DIAGNOSIS — R13.10 DYSPHAGIA, UNSPECIFIED TYPE: ICD-10-CM

## 2024-04-24 DIAGNOSIS — K64.4 HEMORRHOIDS, EXTERNAL: ICD-10-CM

## 2024-04-24 DIAGNOSIS — K31.84 GASTROPARESIS: ICD-10-CM

## 2024-04-24 DIAGNOSIS — Z86.010 HISTORY OF COLON POLYPS: ICD-10-CM

## 2024-04-24 PROCEDURE — 99214 OFFICE O/P EST MOD 30 MIN: CPT | Performed by: PHYSICIAN ASSISTANT

## 2024-04-24 RX ORDER — SODIUM SULFATE, MAGNESIUM SULFATE, AND POTASSIUM CHLORIDE 17.75; 2.7; 2.25 G/1; G/1; G/1
12 TABLETS THE FIRST DOSE THE EVENING BEFORE AND SECOND DOSE THE MORNING OF COLONOSCOPY TABLET ORAL AS DIRECTED
Qty: 24 | Refills: 0 | OUTPATIENT
Start: 2024-04-24 | End: 2024-04-25

## 2024-04-24 RX ORDER — METOCLOPRAMIDE 5 MG/1
1 TABLET 15 MINUTES BEFORE MEALS TABLET ORAL
Qty: 42 | Refills: 0 | Status: ON HOLD | COMMUNITY
Start: 2022-10-14

## 2024-04-24 RX ORDER — PANTOPRAZOLE SODIUM 40 MG/1
1 TABLET TABLET, DELAYED RELEASE ORAL ONCE A DAY
Qty: 30 | Refills: 11 | Status: ACTIVE | COMMUNITY
Start: 2021-05-25

## 2024-04-24 RX ORDER — ONDANSETRON HYDROCHLORIDE 4 MG/1
1 TABLET TABLET, FILM COATED ORAL
Qty: 90 | Refills: 3

## 2024-04-24 RX ORDER — PANTOPRAZOLE SODIUM 40 MG/1
1 TABLET TABLET, DELAYED RELEASE ORAL ONCE A DAY
Qty: 30 | Refills: 11
Start: 2021-05-25

## 2024-04-24 RX ORDER — ONDANSETRON HYDROCHLORIDE 4 MG/1
1 TABLET TABLET, FILM COATED ORAL
Qty: 90 | Refills: 2 | Status: ACTIVE | COMMUNITY

## 2024-04-24 RX ORDER — DICYCLOMINE HYDROCHLORIDE 20 MG/1
TAKE 1 TABLET BY MOUTH THREE TIMES A DAY AS NEEDED TABLET ORAL
Qty: 270 TABLET | Refills: 1 | COMMUNITY

## 2024-04-24 NOTE — PHYSICAL EXAM CONSTITUTIONAL:
well developed, well nourished , in no acute distress , ambulating without difficulty , normal communication ability
The patient is a 89y Female complaining of fall.

## 2024-04-24 NOTE — HPI-TODAY'S VISIT:
This is a 43 y.o. female with h/o GERD, gastric bypass, and gastroparesis. She continues to have daily nausea, frequent vomiting, and abdominal bloating. Also reports occasional regurgitation of food while sleeping.  Patient reports compliance with frequent, small meals.   She presents today c/o prolapsing, uncomfortable hemorrhoids, that bleed intermittently. Bleeding from hemorrhoids can sometimes soak through clothes. She has failed hemorrhoid banding.  Previous Workup GES 10/3/2022 was abnormal.   EGD 9/21/2022 (Dr. Filemon Brown) with gastritis, food residue in stomach, and gastric bypass with a medium-sized pouch. Pathology unremarkable.   Last colonoscopy 5/21/21 (Dr. Murphy) with 3-mm sigmoid polyp (hyperplastic); 4-mm proximal ascending colon polyp (TA) and 10-mm proximal ascending colon polyp (sessile serrated adenoma/polyp overlying a submucosal lipoma). Random bx negative for active or chronic ileitis, and no active, chronic, or microscopic colitis.

## 2024-05-14 ENCOUNTER — TELEPHONE ENCOUNTER (OUTPATIENT)
Dept: URBAN - METROPOLITAN AREA CLINIC 94 | Facility: CLINIC | Age: 44
End: 2024-05-14

## 2024-06-09 ENCOUNTER — TELEPHONE ENCOUNTER (OUTPATIENT)
Dept: URBAN - METROPOLITAN AREA CLINIC 94 | Facility: CLINIC | Age: 44
End: 2024-06-09

## 2024-06-12 ENCOUNTER — TELEPHONE ENCOUNTER (OUTPATIENT)
Dept: URBAN - METROPOLITAN AREA CLINIC 52 | Facility: CLINIC | Age: 44
End: 2024-06-12

## 2024-06-12 ENCOUNTER — OUT OF OFFICE VISIT (OUTPATIENT)
Dept: URBAN - METROPOLITAN AREA SURGERY CENTER 17 | Facility: SURGERY CENTER | Age: 44
End: 2024-06-12
Payer: MEDICARE

## 2024-06-12 ENCOUNTER — CLAIMS CREATED FROM THE CLAIM WINDOW (OUTPATIENT)
Dept: URBAN - METROPOLITAN AREA CLINIC 4 | Facility: CLINIC | Age: 44
End: 2024-06-12
Payer: MEDICARE

## 2024-06-12 DIAGNOSIS — K29.70 GASTRIC INFLAMMATION: ICD-10-CM

## 2024-06-12 DIAGNOSIS — D12.2 ADENOMA OF ASCENDING COLON: ICD-10-CM

## 2024-06-12 DIAGNOSIS — R10.13 ABDOMINAL DISCOMFORT, EPIGASTRIC: ICD-10-CM

## 2024-06-12 DIAGNOSIS — Z98.84 GASTRIC BYPASS STATUS FOR OBESITY: ICD-10-CM

## 2024-06-12 DIAGNOSIS — D12.2 ADENOMATOUS POLYP OF ASCENDING COLON: ICD-10-CM

## 2024-06-12 DIAGNOSIS — K29.61 EROSIVE GASTRITIS WITH HEMORRHAGE: ICD-10-CM

## 2024-06-12 DIAGNOSIS — K64.2 GRADE III INTERNAL HEMORRHOIDS: ICD-10-CM

## 2024-06-12 DIAGNOSIS — K62.5 ANAL BLEEDING: ICD-10-CM

## 2024-06-12 DIAGNOSIS — D12.2 BENIGN NEOPLASM OF ASCENDING COLON: ICD-10-CM

## 2024-06-12 DIAGNOSIS — K29.00 ACUTE GASTRITIS WITHOUT BLEEDING: ICD-10-CM

## 2024-06-12 PROCEDURE — 45385 COLONOSCOPY W/LESION REMOVAL: CPT | Performed by: CLINIC/CENTER

## 2024-06-12 PROCEDURE — 43239 EGD BIOPSY SINGLE/MULTIPLE: CPT | Performed by: CLINIC/CENTER

## 2024-06-12 PROCEDURE — 88305 TISSUE EXAM BY PATHOLOGIST: CPT | Performed by: PATHOLOGY

## 2024-06-12 PROCEDURE — 45385 COLONOSCOPY W/LESION REMOVAL: CPT | Performed by: INTERNAL MEDICINE

## 2024-06-12 PROCEDURE — 43239 EGD BIOPSY SINGLE/MULTIPLE: CPT | Performed by: INTERNAL MEDICINE

## 2024-06-12 PROCEDURE — 88312 SPECIAL STAINS GROUP 1: CPT | Performed by: PATHOLOGY

## 2024-06-12 PROCEDURE — 00813 ANES UPR LWR GI NDSC PX: CPT | Performed by: NURSE ANESTHETIST, CERTIFIED REGISTERED

## 2024-06-12 RX ORDER — DICYCLOMINE HYDROCHLORIDE 20 MG/1
TAKE 1 TABLET BY MOUTH THREE TIMES A DAY AS NEEDED TABLET ORAL
Qty: 270 TABLET | Refills: 1 | COMMUNITY

## 2024-06-12 RX ORDER — LIDOCAINE 50 MG/G
1 APPLICATION AS NEEDED CREAM TOPICAL
Qty: 30 GRAM | Refills: 0 | OUTPATIENT
Start: 2024-06-16 | End: 2024-06-21

## 2024-06-12 RX ORDER — PANTOPRAZOLE SODIUM 40 MG/1
1 TABLET TABLET, DELAYED RELEASE ORAL ONCE A DAY
Qty: 30 | Refills: 11 | Status: ACTIVE | COMMUNITY
Start: 2021-05-25

## 2024-06-12 RX ORDER — METOCLOPRAMIDE 5 MG/1
1 TABLET 15 MINUTES BEFORE MEALS TABLET ORAL
Qty: 42 | Refills: 0 | Status: ON HOLD | COMMUNITY
Start: 2022-10-14

## 2024-06-12 RX ORDER — ONDANSETRON HYDROCHLORIDE 4 MG/1
1 TABLET TABLET, FILM COATED ORAL
Qty: 90 | Refills: 3 | Status: ACTIVE | COMMUNITY

## 2024-06-12 RX ORDER — HYDROCORTISONE 25 MG/G
1 APPLICATION CREAM TOPICAL TWICE A DAY
Qty: 30 GRAM | Refills: 0 | OUTPATIENT
Start: 2024-06-16 | End: 2024-06-30

## 2025-01-29 ENCOUNTER — DASHBOARD ENCOUNTERS (OUTPATIENT)
Age: 45
End: 2025-01-29

## 2025-02-03 ENCOUNTER — OFFICE VISIT (OUTPATIENT)
Dept: URBAN - METROPOLITAN AREA CLINIC 52 | Facility: CLINIC | Age: 45
End: 2025-02-03

## 2025-02-03 RX ORDER — TRAMADOL HYDROCHLORIDE 50 MG/1
1 TABLET AS NEEDED TABLET, FILM COATED ORAL ONCE A DAY
Status: ACTIVE | COMMUNITY

## 2025-02-03 RX ORDER — ONDANSETRON HYDROCHLORIDE 4 MG/1
1 TABLET TABLET, FILM COATED ORAL
Qty: 90 | Refills: 3 | Status: ACTIVE | COMMUNITY

## 2025-02-03 RX ORDER — DICYCLOMINE HYDROCHLORIDE 20 MG/1
TAKE 1 TABLET BY MOUTH THREE TIMES A DAY AS NEEDED TABLET ORAL
Qty: 270 TABLET | Refills: 1 | Status: ACTIVE | COMMUNITY

## 2025-02-03 RX ORDER — METOCLOPRAMIDE 5 MG/1
1 TABLET 15 MINUTES BEFORE MEALS TABLET ORAL
Qty: 42 | Refills: 0 | COMMUNITY
Start: 2022-10-14

## 2025-02-03 RX ORDER — AMLODIPINE BESYLATE 10 MG/1
1 TABLET TABLET ORAL ONCE A DAY
Status: ACTIVE | COMMUNITY

## 2025-02-03 RX ORDER — PROMETHAZINE HYDROCHLORIDE 25 MG/1
1 TABLET AS NEEDED TABLET ORAL
Status: ACTIVE | COMMUNITY

## 2025-02-03 RX ORDER — LOSARTAN POTASSIUM AND HYDROCHLOROTHIAZIDE 50; 12.5 MG/1; MG/1
1 TABLET TABLET, FILM COATED ORAL ONCE A DAY
Status: ACTIVE | COMMUNITY

## 2025-02-03 RX ORDER — PANTOPRAZOLE SODIUM 40 MG/1
1 TABLET TABLET, DELAYED RELEASE ORAL ONCE A DAY
Qty: 30 | Refills: 11 | Status: ACTIVE | COMMUNITY
Start: 2021-05-25

## 2025-02-03 RX ORDER — FEZOLINETANT 45 MG/1
1 TABLET TABLET, FILM COATED ORAL ONCE A DAY
Status: ACTIVE | COMMUNITY